# Patient Record
Sex: FEMALE | Race: WHITE | NOT HISPANIC OR LATINO | Employment: UNEMPLOYED | URBAN - METROPOLITAN AREA
[De-identification: names, ages, dates, MRNs, and addresses within clinical notes are randomized per-mention and may not be internally consistent; named-entity substitution may affect disease eponyms.]

---

## 2010-08-02 LAB — EXTERNAL HIV SCREEN: NORMAL

## 2017-02-28 ENCOUNTER — APPOINTMENT (OUTPATIENT)
Dept: LAB | Age: 47
End: 2017-02-28
Payer: COMMERCIAL

## 2017-02-28 ENCOUNTER — TRANSCRIBE ORDERS (OUTPATIENT)
Dept: LAB | Age: 47
End: 2017-02-28

## 2017-02-28 DIAGNOSIS — E78.1 PURE HYPERGLYCERIDEMIA: ICD-10-CM

## 2017-02-28 DIAGNOSIS — E03.9 HYPOTHYROIDISM: ICD-10-CM

## 2017-02-28 LAB
CHOLEST SERPL-MCNC: 182 MG/DL (ref 50–200)
HDLC SERPL-MCNC: 51 MG/DL (ref 40–60)
LDLC SERPL CALC-MCNC: 109 MG/DL (ref 0–100)
T4 FREE SERPL-MCNC: 1.15 NG/DL (ref 0.76–1.46)
TRIGL SERPL-MCNC: 110 MG/DL
TSH SERPL DL<=0.05 MIU/L-ACNC: 2.43 UIU/ML (ref 0.36–3.74)

## 2017-02-28 PROCEDURE — 80061 LIPID PANEL: CPT

## 2017-02-28 PROCEDURE — 84439 ASSAY OF FREE THYROXINE: CPT

## 2017-02-28 PROCEDURE — 36415 COLL VENOUS BLD VENIPUNCTURE: CPT

## 2017-02-28 PROCEDURE — 84443 ASSAY THYROID STIM HORMONE: CPT

## 2017-04-11 ENCOUNTER — ALLSCRIPTS OFFICE VISIT (OUTPATIENT)
Dept: OTHER | Facility: OTHER | Age: 47
End: 2017-04-11

## 2017-04-11 DIAGNOSIS — E55.9 VITAMIN D DEFICIENCY: ICD-10-CM

## 2017-04-11 DIAGNOSIS — I10 ESSENTIAL (PRIMARY) HYPERTENSION: ICD-10-CM

## 2017-04-11 DIAGNOSIS — E03.9 HYPOTHYROIDISM: ICD-10-CM

## 2017-04-11 DIAGNOSIS — E78.1 PURE HYPERGLYCERIDEMIA: ICD-10-CM

## 2017-04-12 ENCOUNTER — GENERIC CONVERSION - ENCOUNTER (OUTPATIENT)
Dept: OTHER | Facility: OTHER | Age: 47
End: 2017-04-12

## 2017-05-01 ENCOUNTER — ALLSCRIPTS OFFICE VISIT (OUTPATIENT)
Dept: OTHER | Facility: OTHER | Age: 47
End: 2017-05-01

## 2017-07-10 ENCOUNTER — GENERIC CONVERSION - ENCOUNTER (OUTPATIENT)
Dept: OTHER | Facility: OTHER | Age: 47
End: 2017-07-10

## 2017-10-12 ENCOUNTER — ALLSCRIPTS OFFICE VISIT (OUTPATIENT)
Dept: OTHER | Facility: OTHER | Age: 47
End: 2017-10-12

## 2017-10-12 DIAGNOSIS — E78.1 PURE HYPERGLYCERIDEMIA: ICD-10-CM

## 2017-10-12 DIAGNOSIS — L93.0 DISCOID LUPUS ERYTHEMATOSUS: ICD-10-CM

## 2017-10-12 DIAGNOSIS — E03.9 HYPOTHYROIDISM: ICD-10-CM

## 2017-10-13 NOTE — PROGRESS NOTES
Assessment  1  Hypertension, essential, benign (401 1) (I10)   2  Lupus (695 4) (L93 0)   3  Vitamin D deficiency (268 9) (E55 9)   4  Acquired hypothyroidism (244 9) (E03 9)   5  Essential hypertriglyceridemia (272 1) (E78 1)    Plan  Acquired hypothyroidism    · (1) T4, FREE; Status:Active; Requested for:12Oct2017;    · (1) TSH; Status:Active; Requested for:12Oct2017;   Depression screening    · PHQ-2 Adult Depression Screening ; every 1 year; Last 85SBU1925; Next 02XFT5166; Status:Active  Essential hypertriglyceridemia    · (1) COMPREHENSIVE METABOLIC PANEL; Status:Active; Requested for:12Oct2017;    · (1) LIPID PANEL FASTING W DIRECT LDL REFLEX; Status:Active; Requested for:12Oct2017;   Lupus    · (1) CBC/PLT/DIFF; Status:Active; Requested for:12Oct2017;    · Follow-up visit in 6 months Evaluation and Treatment  Follow-up  Status: Hold For - Scheduling   Requested for: 65CDH3467    Discussion/Summary  Discussion Summary:   Keep synthroid at 50 mcg daily, check labs in 6 months  no changes to medications  Counseling Documentation With Imm: The patient was counseled regarding diagnostic results  Chief Complaint  Chief Complaint Free Text Note Form: Patient is here for 6 months f/u for Hypertension, Lupus and Vitamin D deficiency  Review blood work  Pt does not have any new complains at the moment  was recommended to have a Pap test       History of Present Illness  SLE (Brief): The patient is being seen for a routine clinic follow-up of systemic lupus erythematosus  Symptoms:  joint pain, but-no fever,-no malaise,-no weight loss-and-no chest pain-   The patient presents with complaints of visual problems (follows with eye doctor every 6 months  )  (Follows with rheumatology  Plaquenil daily, Mobic daily, and Flexeril as needed  )   Vitamin D Deficiency: The patient is being seen for follow-up of vitamin D deficiency  Current treatment includes vitamin D3 (cholecalciferol) and 6000 IU per day   Symptoms: muscle twitching, but-no fatigue,-no brittle nails-and-no depression  Hypertension (Follow-Up): The patient presents for follow-up of essential hypertension  The patient states she has been doing well with her blood pressure control since the last visit  Symptoms: denies impaired vision,-denies dyspnea,-denies chest pain,-denies intermittent leg claudication-and-improved lower extremity edema  Associated symptoms include no headache  Home monitoring: The patient is not checking blood pressure at home  Lifestyle: Diet: She consumes a diverse and healthy diet  Exercise: She exercises regularly  (walk a lot )  Additional History: follows eye doctor every 6 months  Hyperlipidemia (Initial): The patient is being seen for a routine clinic follow-up of hyperlipidemia  Recent measurements for this patient were taken changed fish oil brand  Recent measurements: total cholesterol 182 mg/dL and  mg/dL  Her total cholesterol is decreasing and LDL is increasing  Associated symptoms:  no chest pain,-no exertional calf cramps-and-no nausea-   The patient presents with complaints of myalgias (attributes to lupus and connective tissue disease)  Hypothyroidism (Follow-Up): The patient is being seen for follow-up of hypothyroidism of undetermined etiology  The patient reports doing well  Interval Events: TSH in 12/16 was 8 64, now on levothyroxine is 2 4   Interval symptoms:  improved weight gain,-stable cold intolerance,-stable fatigue-and-denies constipation  Medications:  the patient is adherent to her medication regimen-(Levothyroxine 50mcg )  Review of Systems  Complete-Female:   Constitutional: no fever,-no recent weight gain-and-no recent weight loss  ENT: sore throat, but-no nosebleeds  Cardiovascular: no chest pain-and-no lower extremity edema  Respiratory: no shortness of breath,-no cough-and-no wheezing  Gastrointestinal: no nausea,-no vomiting-and-no diarrhea     Genitourinary: no dysuria  Musculoskeletal: Joint soreness back to normal Achiness per patient  , but-no joint swelling-and-no joint stiffness  Integumentary: itching  Neurological: no headache-and-no dizziness  Psychiatric: no sleep disturbances  Active Problems  1  Acquired hypothyroidism (244 9) (E03 9)   2  Essential hypertriglyceridemia (272 1) (E78 1)   3  Hypertension, essential, benign (401 1) (I10)   4  Lupus (695 4) (L93 0)   5  Vitamin D deficiency (268 9) (E55 9)    Past Medical History  1  History of Arthralgia (719 40) (M25 50)   2  History of Hand pain (729 5) (M79 643)   3  History of acute pharyngitis (V12 69) (Z87 09)   4  History of joint stiffness (V13 59) (Z87 39)   5  History of sinusitis (V12 69) (Z87 09)   6  History of Joint stiffness (719 50) (M25 60)   7  History of Mucositis (528 00) (K12 30)   8  History of Screening cholesterol level (V77 91) (Z13 220)   9  History of Sore throat (462) (J02 9)   10  History of Tooth pain (525 9) (K08 89)   11  History of Tooth pain (525 9) (K08 89)    Family History  Mother    1  Family history of Glaucoma   2  Family history of Type 2 diabetes mellitus  Father    3  Family history of Hypertension  Family History    4  Family history of Type 2 diabetes mellitus    Social History   · Does not exercise (V69 0) (Z72 3)   · Never a smoker   · No drug use   · No known STD risk factors   · Number of children   · Occupation   · Single   · Social alcohol use (Z78 9)  Social History Reviewed: The social history was reviewed and updated today  Current Meds   1  AmLODIPine Besylate 5 MG Oral Tablet; Take 1 tablet daily; Last Rx:11Apr2017 Ordered   2  Fish Oil CAPS; TAKE 1 CAPSULE Daily (1400 MG); Therapy: (Recorded:11Oct2017) to Recorded   3  Flexeril 5 MG TABS; take 1 tablet daily prn; Therapy: (Recorded:11Oct2017) to Recorded   4  Levothyroxine Sodium 50 MCG Oral Tablet; TAKE 1 TABLET DAILY; Therapy: 30Wld8987 to (Evaluate:08Oct2017);  Last Rx:11Apr2017 Ordered   5  Mobic 15 MG Oral Tablet; Take 1 tablet daily; Therapy: (Recorded:11Oct2017) to Recorded   6  Multiple Vitamin TABS; Take 1 tablet daily; Therapy: (Recorded:11Oct2017) to Recorded   7  Oxycodone-Acetaminophen 5-325 MG Oral Tablet; TAKE 1 TO 2 TABLETS EVERY 4 TO 6 HOURS AS   NEEDED FOR PAIN;   Therapy: 61IWR0118 to (Evaluate:44Ypx0118); Last JJ:16BMB5272 Ordered   8  Plaquenil 200 MG Oral Tablet; take 2 tablet daily; Therapy: (Recorded:11Oct2017) to Recorded   9  Vitamin D3 3000 UNIT Oral Tablet; take 2 tablet daily; Therapy: (Recorded:11Oct2017) to Recorded    Allergies  1  Penicillins  2  No Known Environmental Allergies   3  No Known Food Allergies    Vitals  Vital Signs    Recorded: 38KYV5616 08:49AM   Temperature 98 2 F, Oral   Heart Rate 93   Systolic 802, LUE, Sitting   Diastolic 80, LUE, Sitting   BP CUFF SIZE Large   Height 5 ft 4 in   Weight 234 lb 4 oz   BMI Calculated 40 21   BSA Calculated 2 09   O2 Saturation 99     Physical Exam    Constitutional   General appearance: No acute distress, well appearing and well nourished  -Alert, pleasant, cooperative, seated in room in NAD  Ears, Nose, Mouth, and Throat   External inspection of ears and nose: Normal     Oropharynx: Normal with no erythema, edema, exudate or lesions  Pulmonary   Respiratory effort: No increased work of breathing or signs of respiratory distress  -CTA throughout, no wheeze, no resp distress  Auscultation of lungs: Clear to auscultation  Cardiovascular   Auscultation of heart: Normal rate and rhythm, normal S1 and S2, without murmurs  -2/6, systolic murmur  Abdomen   Abdomen: Non-tender, no masses  Liver and spleen: No hepatomegaly or splenomegaly  Lymphatic   Palpation of lymph nodes in neck: No lymphadenopathy  -supple  Skin   Skin and subcutaneous tissue: Normal without rashes or lesions      Psychiatric   Mood and affect: Normal          Results/Data  PHQ-2 Adult Depression Screening 12Oct2017 08: 93IW User, Shaneka     Test Name Result Flag Reference   PHQ-2 Adult Depression Score 0     Over the last two weeks, how often have you been bothered by any of the following problems? Little interest or pleasure in doing things: Not at all - 0  Feeling down, depressed, or hopeless: Not at all - 0   PHQ-2 Adult Depression Screening Negative       (1) LIPID PANEL FASTING W DIRECT LDL REFLEX 03Oct2017 01:39PM James Dickens     Test Name Result Flag Reference   CHOLESTEROL 216     LDL CHOLESTEROL CALCULATED 135     TRIGLYCERIDES 119     HDL,DIRECT 57       Summary / No summary entered :      No summary entered  Documents attached :      189 Sirisha Hazel Work - [facility] pa, team; Enc: [id number] - Image Encounter - [facility] pa, team -      (Interventional Medicine) (Additional Information Document)  Health Management  Depression screening   PHevery-2 Adult Depression Screening; every 1 year; Last 01ULZ0021; Next Due: 52VVW3814; Active  History of Breast cancer screening   * MAMMO SCREENING BILATERAL W CAD; every 1 year; Last 85ADX3845; Next Due: 06Err3590; Active  History of Screening for malignant neoplasm of cervix   (1) THIN PREP PAP FOLLOW UP WITH IMAGING; every 3 years; Last 18MCS8819; Next Due:  76CLB9302;  Overdue    Signatures   Electronically signed by : Dani Santoyo DO; Oct 12 2017  9:22AM EST                       (Author)

## 2017-10-17 ENCOUNTER — GENERIC CONVERSION - ENCOUNTER (OUTPATIENT)
Dept: OTHER | Facility: OTHER | Age: 47
End: 2017-10-17

## 2018-01-13 VITALS
DIASTOLIC BLOOD PRESSURE: 80 MMHG | WEIGHT: 234.25 LBS | HEIGHT: 64 IN | BODY MASS INDEX: 39.99 KG/M2 | SYSTOLIC BLOOD PRESSURE: 120 MMHG | TEMPERATURE: 98.2 F | OXYGEN SATURATION: 99 % | HEART RATE: 93 BPM

## 2018-01-13 NOTE — RESULT NOTES
Verified Results  (1) TSH 25PGI6829 08:09AM Bob Bracket    Order Number: [id number]     Test Name Result Flag Reference   TSH 8 640 uIU/mL H 0 358-3 740   - Patient Instructions: This bloodwork is non-fasting  Please drink two glasses of water morning of bloodwork  - Patient Instructions: This bloodwork is non-fasting  Please drink two glasses of water morning of bloodwork  Patients undergoing fluorescein dye angiography may retain small amounts of fluorescein in the body for 48-72 hours post procedure  Samples containing fluorescein can produce falsely depressed TSH values  If the patient had this procedure,a specimen should be resubmitted post fluorescein clearance            The recommended reference ranges for TSH during pregnancy are as follows:  First trimester 0 1 to 2 5 uIU/mL  Second trimester  0 2 to 3 0 uIU/mL  Third trimester 0 3 to 3 0 uIU/m

## 2018-01-14 VITALS
OXYGEN SATURATION: 98 % | BODY MASS INDEX: 39.82 KG/M2 | DIASTOLIC BLOOD PRESSURE: 80 MMHG | HEIGHT: 64 IN | HEART RATE: 92 BPM | WEIGHT: 233.25 LBS | SYSTOLIC BLOOD PRESSURE: 128 MMHG | TEMPERATURE: 98.5 F

## 2018-01-15 VITALS
HEIGHT: 64 IN | TEMPERATURE: 99 F | WEIGHT: 234.6 LBS | OXYGEN SATURATION: 98 % | SYSTOLIC BLOOD PRESSURE: 118 MMHG | DIASTOLIC BLOOD PRESSURE: 68 MMHG | BODY MASS INDEX: 40.05 KG/M2 | HEART RATE: 89 BPM

## 2018-04-04 DIAGNOSIS — I10 HYPERTENSION, UNSPECIFIED TYPE: Primary | ICD-10-CM

## 2018-04-04 DIAGNOSIS — E03.9 HYPOTHYROIDISM, UNSPECIFIED TYPE: ICD-10-CM

## 2018-04-04 RX ORDER — LEVOTHYROXINE SODIUM 0.05 MG/1
50 TABLET ORAL DAILY
Qty: 90 TABLET | Refills: 1 | Status: SHIPPED | OUTPATIENT
Start: 2018-04-04 | End: 2018-11-05 | Stop reason: SDUPTHER

## 2018-04-04 RX ORDER — AMLODIPINE BESYLATE 5 MG/1
5 TABLET ORAL DAILY
Qty: 90 TABLET | Refills: 1 | Status: SHIPPED | OUTPATIENT
Start: 2018-04-04 | End: 2018-11-05 | Stop reason: SDUPTHER

## 2018-04-04 RX ORDER — LEVOTHYROXINE SODIUM 0.05 MG/1
1 TABLET ORAL DAILY
COMMUNITY
Start: 2016-12-22 | End: 2018-04-04 | Stop reason: SDUPTHER

## 2018-04-04 RX ORDER — AMLODIPINE BESYLATE 5 MG/1
1 TABLET ORAL DAILY
COMMUNITY
End: 2018-04-04 | Stop reason: SDUPTHER

## 2018-04-27 ENCOUNTER — OFFICE VISIT (OUTPATIENT)
Dept: INTERNAL MEDICINE CLINIC | Facility: CLINIC | Age: 48
End: 2018-04-27
Payer: COMMERCIAL

## 2018-04-27 VITALS
BODY MASS INDEX: 41.11 KG/M2 | OXYGEN SATURATION: 98 % | HEIGHT: 64 IN | DIASTOLIC BLOOD PRESSURE: 80 MMHG | TEMPERATURE: 98.3 F | WEIGHT: 240.8 LBS | SYSTOLIC BLOOD PRESSURE: 132 MMHG | HEART RATE: 88 BPM

## 2018-04-27 DIAGNOSIS — L93.0 LUPUS ERYTHEMATOSUS, UNSPECIFIED FORM: ICD-10-CM

## 2018-04-27 DIAGNOSIS — E55.9 VITAMIN D DEFICIENCY: ICD-10-CM

## 2018-04-27 DIAGNOSIS — I10 BENIGN ESSENTIAL HYPERTENSION: ICD-10-CM

## 2018-04-27 DIAGNOSIS — E78.1 ESSENTIAL HYPERTRIGLYCERIDEMIA: Primary | ICD-10-CM

## 2018-04-27 DIAGNOSIS — E03.9 ACQUIRED HYPOTHYROIDISM: ICD-10-CM

## 2018-04-27 DIAGNOSIS — R53.83 FATIGUE, UNSPECIFIED TYPE: ICD-10-CM

## 2018-04-27 PROCEDURE — 99214 OFFICE O/P EST MOD 30 MIN: CPT | Performed by: FAMILY MEDICINE

## 2018-04-27 PROCEDURE — 3079F DIAST BP 80-89 MM HG: CPT | Performed by: FAMILY MEDICINE

## 2018-04-27 PROCEDURE — 3075F SYST BP GE 130 - 139MM HG: CPT | Performed by: FAMILY MEDICINE

## 2018-04-27 RX ORDER — CYCLOBENZAPRINE HCL 10 MG
1 TABLET ORAL DAILY PRN
COMMUNITY
Start: 2017-10-24 | End: 2022-03-18 | Stop reason: SDUPTHER

## 2018-04-27 RX ORDER — ASCORBIC ACID 1000 MG
1000 TABLET, EXTENDED RELEASE ORAL DAILY
COMMUNITY
Start: 2013-10-22

## 2018-04-27 RX ORDER — HYDROXYCHLOROQUINE SULFATE 200 MG/1
400 TABLET, FILM COATED ORAL DAILY
COMMUNITY
Start: 2018-04-03 | End: 2019-04-25 | Stop reason: SDUPTHER

## 2018-04-27 RX ORDER — MELOXICAM 15 MG/1
1 TABLET ORAL DAILY
COMMUNITY
End: 2019-04-25 | Stop reason: SDUPTHER

## 2018-04-27 NOTE — PROGRESS NOTES
Assessment  1  Hypertension, essential, benign (401 1) (I10)   2  Lupus (695 4) (L93 0)   3  Vitamin D deficiency (268 9) (E55 9)   4  Acquired hypothyroidism (244 9) (E03 9)   5  Essential hypertriglyceridemia (272 1) (E78 1)     Plan  Acquired hypothyroidism    ·    ·  Essential hypertriglyceridemia    · Lupus   Discussion/Summary  Discussion Summary:   Keep synthroid at 50 mcg daily, no check labs in 6 months  no changes to medications  Counseling Documentation With Imm: The patient was counseled regarding diagnostic results  Chief Complaint  Chief Complaint Free Text Note Form: Patient is here for 6 months f/u for Hypertension, Lupus and Vitamin D deficiency  Review blood work  Pt does not have any new complains      History of Present Illness  SLE (Brief): The patient is being seen for a routine clinic follow-up of systemic lupus erythematosus  Symptoms:  joint pain, but-no fever,-no malaise,-no weight loss-and-no chest pain-   The patient presents with complaints of visual problems (follows with eye doctor every 6 months  )  (Follows with rheumatology  Plaquenil daily, Mobic daily, and Flexeril as needed  )     Vitamin D Deficiency: The patient is being seen for follow-up of vitamin D deficiency  Current treatment includes vitamin D3 (cholecalciferol) and 6000 IU per day  Symptoms:  myalgias but-no fatigue,-no brittle nails-and-no depression  Hypertension (Follow-Up): The patient presents for follow-up of essential hypertension  The patient states she has been doing well with her blood pressure control since the last visit  Symptoms: denies impaired vision,-denies dyspnea,-denies chest pain,-denies intermittent leg claudication-and-improved lower extremity edema  Associated symptoms include no headache  Home monitoring: The patient is not checking blood pressure at home  Lifestyle: Diet: She consumes a diverse and healthy diet  Exercise: She exercises regularly  (walk a lot )     Additional History: follows eye doctor every 6 months  Hyperlipidemia (Initial): The patient is being seen for a routine clinic follow-up of hyperlipidemia  Recent measurements for this patient were taken changed fish oil brand  Recent measurements: total cholesterol 182 mg/dL and  mg/dL  Her total cholesterol is decreasing and LDL is increasing  Associated symptoms:  no chest pain,-no exertional calf cramps-and-no nausea-   The patient presents with complaints of myalgias (attributes to lupus and connective tissue disease)  Hypothyroidism (Follow-Up): The patient is being seen for follow-up of hypothyroidism of undetermined etiology  The patient reports doing well  Interval Events: TSH in 12/16 was 8 64, now on levothyroxine is 2 4   Doing well  Interval symptoms:  improved weight gain,-stable cold intolerance,-stable fatigue-and-denies constipation  Medications:  the patient is adherent to her medication regimen-(Levothyroxine 50mcg )  Review of Systems  Complete-Female:   Constitutional: no fever,- recent weight gain-and-no recent weight loss  ENT: sore throat, but-no nosebleeds  Cardiovascular: no chest pain-and-no lower extremity edema  Respiratory: no shortness of breath,-no cough-and-no wheezing  Gastrointestinal: no nausea,-no vomiting-and-no diarrhea  Genitourinary: no dysuria  Musculoskeletal: Joint soreness back to normal Achiness per patient  , but-no joint swelling-and-no joint stiffness  Integumentary: no rashes or issues  Neurological: no headache-and-no dizziness  Psychiatric: no sleep disturbances  Vitals:    04/27/18 0827   BP: 132/80   Pulse: 88   Temp: 98 3 °F (36 8 °C)   SpO2: 98%     Physical Exam     Constitutional   General appearance: No acute distress, well appearing and well nourished  -Alert, pleasant, cooperative, seated in room in NAD     Ears, Nose, Mouth, and Throat   External inspection of ears and nose: Normal     Oropharynx: Normal with no erythema, edema, exudate or lesions  Pulmonary   Respiratory effort: No increased work of breathing or signs of respiratory distress  -CTA throughout, no wheeze, no resp distress  Auscultation of lungs: Clear to auscultation  Cardiovascular   Auscultation of heart: Normal rate and rhythm, normal S1 and S2, without murmurs  -2/6, systolic murmur  Abdomen   Abdomen: Non-tender, no masses  Liver and spleen: No hepatomegaly or splenomegaly  Lymphatic   Palpation of lymph nodes in neck: No lymphadenopathy  -supple  Skin   Skin and subcutaneous tissue: Normal without rashes or lesions      Psychiatric   Mood and affect: Normal

## 2018-10-26 ENCOUNTER — OFFICE VISIT (OUTPATIENT)
Dept: INTERNAL MEDICINE CLINIC | Facility: CLINIC | Age: 48
End: 2018-10-26
Payer: COMMERCIAL

## 2018-10-26 VITALS
HEIGHT: 63 IN | OXYGEN SATURATION: 98 % | TEMPERATURE: 98.5 F | DIASTOLIC BLOOD PRESSURE: 80 MMHG | HEART RATE: 82 BPM | SYSTOLIC BLOOD PRESSURE: 138 MMHG | BODY MASS INDEX: 41.71 KG/M2 | WEIGHT: 235.4 LBS

## 2018-10-26 DIAGNOSIS — E78.1 ESSENTIAL HYPERTRIGLYCERIDEMIA: ICD-10-CM

## 2018-10-26 DIAGNOSIS — Z23 NEEDS FLU SHOT: Primary | ICD-10-CM

## 2018-10-26 DIAGNOSIS — I10 BENIGN ESSENTIAL HYPERTENSION: ICD-10-CM

## 2018-10-26 DIAGNOSIS — E55.9 VITAMIN D DEFICIENCY: ICD-10-CM

## 2018-10-26 DIAGNOSIS — E03.9 ACQUIRED HYPOTHYROIDISM: ICD-10-CM

## 2018-10-26 PROCEDURE — 3079F DIAST BP 80-89 MM HG: CPT | Performed by: FAMILY MEDICINE

## 2018-10-26 PROCEDURE — 90686 IIV4 VACC NO PRSV 0.5 ML IM: CPT

## 2018-10-26 PROCEDURE — 90471 IMMUNIZATION ADMIN: CPT

## 2018-10-26 PROCEDURE — 99214 OFFICE O/P EST MOD 30 MIN: CPT | Performed by: FAMILY MEDICINE

## 2018-10-26 PROCEDURE — 3075F SYST BP GE 130 - 139MM HG: CPT | Performed by: FAMILY MEDICINE

## 2018-10-26 NOTE — PROGRESS NOTES
Assessment  1  Hypertension, essential, benign (401 1) (I10)   2  Lupus (695 4) (L93 0)   3  Vitamin D deficiency (268 9) (E55 9)   4  Acquired hypothyroidism (244 9) (E03 9)   5  Essential hypertriglyceridemia (272 1) (E78 1)     Plan  Acquired hypothyroidism    ·    ·  Essential hypertriglyceridemia    · Lupus   Discussion/Summary  Discussion Summary:   Keep synthroid at 50 mcg daily, no check labs in 6 months  no changes to medications  Congratulations on weight loss !! Keep appt with rheumatology  Counseling Documentation With Imm: The patient was counseled regarding diagnostic results  Chief Complaint  Chief Complaint Free Text Note Form: Patient is here for 6 months f/u for Hypertension, Lupus and Vitamin D deficiency  Review blood work  Pt does not have any new complains      History of Present Illness  SLE (Brief): The patient is being seen for a routine clinic follow-up of systemic lupus erythematosus  Symptoms:  joint pain, but-no fever,-no malaise,-no weight loss-and-no chest pain-   The patient presents with complaints of visual problems (follows with eye doctor every 6 months  )  (Follows with rheumatology  Plaquenil daily, Mobic daily, and Flexeril as needed  )     Vitamin D Deficiency: The patient is being seen for follow-up of vitamin D deficiency  Current treatment includes vitamin D3 (cholecalciferol) and 6000 IU per day  Symptoms:  myalgias but-no fatigue,-no brittle nails-and-no depression  Hypertension (Follow-Up): The patient presents for follow-up of essential hypertension  The patient states she has been doing well with her blood pressure control since the last visit  Symptoms: denies impaired vision,-denies dyspnea,-denies chest pain,-denies intermittent leg claudication-and-improved lower extremity edema  Associated symptoms include no headache  Home monitoring: The patient is not checking blood pressure at home     Lifestyle: Diet: She consumes a diverse and healthy diet Exercise: She exercises regularly  (walk a lot )  Additional History: follows eye doctor every 6 months  Hyperlipidemia (Initial): The patient is being seen for a routine clinic follow-up of hyperlipidemia  Recent measurements for this patient were taken changed fish oil brand  Recent measurements: total cholesterol 182 mg/dL and  mg/dL  Her total cholesterol is decreasing and LDL is increasing  Associated symptoms:  no chest pain,-no exertional calf cramps-and-no nausea-   The patient presents with complaints of myalgias (attributes to lupus and connective tissue disease)  Hypothyroidism (Follow-Up): The patient is being seen for follow-up of hypothyroidism of undetermined etiology  The patient reports doing well  Interval Events: TSH in 12/16 was 8 64, now on levothyroxine is 2 4   Doing well  Interval symptoms:  improved weight gain,-stable cold intolerance,-stable fatigue-and-denies constipation  Medications:  the patient is adherent to her medication regimen-(Levothyroxine 50mcg )  Review of Systems  Complete-Female:   Constitutional: no fever,- recent weight gain-and- 5 lb weight loss  Cardiovascular: no chest pain-and-no lower extremity edema  Respiratory: no shortness of breath,-no cough-and-no wheezing  Gastrointestinal: no nausea,-no vomiting-and-no diarrhea  Genitourinary: no dysuria  Musculoskeletal: Joint soreness back to normal Achiness per patient  , but-no joint swelling-and-no joint stiffness  Integumentary: no rashes or issues  Neurological: no headache-and-no dizziness  Psychiatric: no sleep disturbances  Vitals:    10/26/18 0927   BP: 138/80   Pulse: 82   Temp: 98 5 °F (36 9 °C)   SpO2: 98%     Physical Exam     Constitutional   General appearance: No acute distress, well appearing and well nourished  -Alert, pleasant, cooperative, seated in room in NAD     Ears, Nose, Mouth, and Throat   External inspection of ears and nose: Normal  Oropharynx: Normal with no erythema, edema, exudate or lesions  Pulmonary   Respiratory effort: No increased work of breathing or signs of respiratory distress  -CTA throughout, no wheeze, no resp distress  Auscultation of lungs: Clear to auscultation  Cardiovascular   Auscultation of heart: Normal rate and rhythm, normal S1 and S2, without murmurs  -2/6, systolic murmur  Abdomen   Abdomen: Non-tender, no masses  Liver and spleen: No hepatomegaly or splenomegaly  Lymphatic   Palpation of lymph nodes in neck: No lymphadenopathy  -supple  Skin   Skin and subcutaneous tissue: Normal without rashes or lesions      Psychiatric   Mood and affect: Normal

## 2018-11-05 DIAGNOSIS — I10 HYPERTENSION, UNSPECIFIED TYPE: ICD-10-CM

## 2018-11-05 DIAGNOSIS — E03.9 HYPOTHYROIDISM, UNSPECIFIED TYPE: ICD-10-CM

## 2018-11-05 RX ORDER — AMLODIPINE BESYLATE 5 MG/1
5 TABLET ORAL DAILY
Qty: 90 TABLET | Refills: 1 | Status: CANCELLED | OUTPATIENT
Start: 2018-11-05

## 2018-11-05 RX ORDER — LEVOTHYROXINE SODIUM 0.05 MG/1
50 TABLET ORAL DAILY
Qty: 90 TABLET | Refills: 1 | Status: SHIPPED | OUTPATIENT
Start: 2018-11-05 | End: 2019-04-25 | Stop reason: SDUPTHER

## 2018-11-05 RX ORDER — LEVOTHYROXINE SODIUM 0.05 MG/1
50 TABLET ORAL DAILY
Qty: 90 TABLET | Refills: 1 | Status: CANCELLED | OUTPATIENT
Start: 2018-11-05

## 2018-11-05 RX ORDER — AMLODIPINE BESYLATE 5 MG/1
5 TABLET ORAL DAILY
Qty: 90 TABLET | Refills: 1 | Status: SHIPPED | OUTPATIENT
Start: 2018-11-05 | End: 2019-04-25 | Stop reason: SDUPTHER

## 2018-11-20 ENCOUNTER — OFFICE VISIT (OUTPATIENT)
Dept: INTERNAL MEDICINE CLINIC | Age: 48
End: 2018-11-20
Payer: COMMERCIAL

## 2018-11-20 VITALS
OXYGEN SATURATION: 96 % | HEIGHT: 63 IN | HEART RATE: 93 BPM | DIASTOLIC BLOOD PRESSURE: 76 MMHG | WEIGHT: 234.8 LBS | SYSTOLIC BLOOD PRESSURE: 126 MMHG | TEMPERATURE: 98.1 F | BODY MASS INDEX: 41.6 KG/M2

## 2018-11-20 DIAGNOSIS — J06.0 SORE THROAT AND LARYNGITIS: Primary | ICD-10-CM

## 2018-11-20 PROCEDURE — 3008F BODY MASS INDEX DOCD: CPT | Performed by: FAMILY MEDICINE

## 2018-11-20 PROCEDURE — 1036F TOBACCO NON-USER: CPT | Performed by: FAMILY MEDICINE

## 2018-11-20 PROCEDURE — 99213 OFFICE O/P EST LOW 20 MIN: CPT | Performed by: FAMILY MEDICINE

## 2018-11-20 RX ORDER — CEPHALEXIN 500 MG/1
500 CAPSULE ORAL
Qty: 21 CAPSULE | Refills: 0 | Status: SHIPPED | OUTPATIENT
Start: 2018-11-20 | End: 2018-11-27

## 2018-11-20 NOTE — PROGRESS NOTES
Assessment/Plan:    No problem-specific Assessment & Plan notes found for this encounter  Problem List Items Addressed This Visit        Respiratory    Sore throat and laryngitis - Primary    Relevant Medications    cephalexin (KEFLEX) 500 mg capsule            Subjective:      Patient ID: Regina Woods is a 57 y o  female  HPI       sore throat x 6 days, using OTC mucinex, fevers at  Home > 100  Feels very achy, no sick contact         The following portions of the patient's history were reviewed and updated as appropriate: allergies, current medications, past family history, past medical history, past social history, past surgical history and problem list     Review of Systems    Constitutional:  +fever- > 100 0 or chills   Eyes:  Denies change in visual acuity   HENT:   See HPI   Respiratory:  Denies  shortness of breath or wheezing, + cough  GI:  Denies abdominal pain, nausea, or vomiting    Neurologic:  Denies headache or focal weakness        Objective:      /76 (BP Location: Left arm, Patient Position: Sitting, Cuff Size: Adult)   Pulse 93   Temp 98 1 °F (36 7 °C) (Tympanic)   Ht 5' 2 99" (1 6 m)   Wt 107 kg (234 lb 12 8 oz)   SpO2 96%   BMI 41 60 kg/m²          Physical Exam    Constitutional:  Well developed, well nourished, no acute distress, non-toxic appearance , very hoarse voice  Eyes:  PERRL, conjunctiva normal , non icteric sclera  HENT:  Atraumatic, oropharynx moist  Neck-  supple , + PND, = sinus TTP  Respiratory:  CTA b/l, normal breath sounds, no rales, no wheezing   Cardiovascular:  RRR, no murmurs, no LE edema b/l  GI:  Soft, nondistended, normal bowel sounds x 4, nontender, no organomegaly, no mass, no rebound, no guarding   Neurologic:  no focal deficits noted   Psychiatric:  Speech and behavior appropriate , AAO x 3

## 2018-11-21 ENCOUNTER — DOCUMENTATION (OUTPATIENT)
Dept: INTERNAL MEDICINE CLINIC | Age: 48
End: 2018-11-21

## 2019-04-25 ENCOUNTER — OFFICE VISIT (OUTPATIENT)
Dept: INTERNAL MEDICINE CLINIC | Age: 49
End: 2019-04-25
Payer: COMMERCIAL

## 2019-04-25 VITALS
WEIGHT: 233.4 LBS | TEMPERATURE: 98.4 F | HEART RATE: 89 BPM | OXYGEN SATURATION: 98 % | BODY MASS INDEX: 41.36 KG/M2 | SYSTOLIC BLOOD PRESSURE: 124 MMHG | DIASTOLIC BLOOD PRESSURE: 82 MMHG

## 2019-04-25 DIAGNOSIS — I10 HYPERTENSION, UNSPECIFIED TYPE: ICD-10-CM

## 2019-04-25 DIAGNOSIS — E78.1 ESSENTIAL HYPERTRIGLYCERIDEMIA: ICD-10-CM

## 2019-04-25 DIAGNOSIS — I10 BENIGN ESSENTIAL HYPERTENSION: Primary | ICD-10-CM

## 2019-04-25 DIAGNOSIS — E03.9 HYPOTHYROIDISM, UNSPECIFIED TYPE: ICD-10-CM

## 2019-04-25 DIAGNOSIS — E03.9 ACQUIRED HYPOTHYROIDISM: ICD-10-CM

## 2019-04-25 DIAGNOSIS — M35.1 MIXED CONNECTIVE TISSUE DISEASE (HCC): ICD-10-CM

## 2019-04-25 DIAGNOSIS — E55.9 VITAMIN D DEFICIENCY: ICD-10-CM

## 2019-04-25 PROBLEM — J06.0 SORE THROAT AND LARYNGITIS: Status: RESOLVED | Noted: 2018-11-20 | Resolved: 2019-04-25

## 2019-04-25 PROCEDURE — 1036F TOBACCO NON-USER: CPT | Performed by: FAMILY MEDICINE

## 2019-04-25 PROCEDURE — 99214 OFFICE O/P EST MOD 30 MIN: CPT | Performed by: FAMILY MEDICINE

## 2019-04-25 PROCEDURE — 3074F SYST BP LT 130 MM HG: CPT | Performed by: FAMILY MEDICINE

## 2019-04-25 PROCEDURE — 3079F DIAST BP 80-89 MM HG: CPT | Performed by: FAMILY MEDICINE

## 2019-04-25 RX ORDER — MELOXICAM 15 MG/1
15 TABLET ORAL DAILY
Qty: 90 TABLET | Refills: 1 | Status: SHIPPED | OUTPATIENT
Start: 2019-04-25 | End: 2021-09-24 | Stop reason: SDUPTHER

## 2019-04-25 RX ORDER — LEVOTHYROXINE SODIUM 0.05 MG/1
50 TABLET ORAL DAILY
Qty: 90 TABLET | Refills: 1 | Status: SHIPPED | OUTPATIENT
Start: 2019-04-25 | End: 2019-10-29 | Stop reason: SDUPTHER

## 2019-04-25 RX ORDER — HYDROXYCHLOROQUINE SULFATE 200 MG/1
400 TABLET, FILM COATED ORAL DAILY
Qty: 180 TABLET | Refills: 1 | Status: SHIPPED | OUTPATIENT
Start: 2019-04-25 | End: 2021-09-24 | Stop reason: SDUPTHER

## 2019-04-25 RX ORDER — AMLODIPINE BESYLATE 5 MG/1
5 TABLET ORAL DAILY
Qty: 90 TABLET | Refills: 1 | Status: SHIPPED | OUTPATIENT
Start: 2019-04-25 | End: 2021-09-24 | Stop reason: SDUPTHER

## 2019-10-29 ENCOUNTER — OFFICE VISIT (OUTPATIENT)
Dept: INTERNAL MEDICINE CLINIC | Facility: CLINIC | Age: 49
End: 2019-10-29
Payer: COMMERCIAL

## 2019-10-29 VITALS
HEIGHT: 63 IN | TEMPERATURE: 98.3 F | HEART RATE: 86 BPM | BODY MASS INDEX: 41.36 KG/M2 | WEIGHT: 233.4 LBS | SYSTOLIC BLOOD PRESSURE: 120 MMHG | DIASTOLIC BLOOD PRESSURE: 82 MMHG | OXYGEN SATURATION: 97 %

## 2019-10-29 DIAGNOSIS — E03.9 HYPOTHYROIDISM, UNSPECIFIED TYPE: ICD-10-CM

## 2019-10-29 DIAGNOSIS — E03.9 ACQUIRED HYPOTHYROIDISM: Primary | ICD-10-CM

## 2019-10-29 DIAGNOSIS — E55.9 VITAMIN D DEFICIENCY: ICD-10-CM

## 2019-10-29 DIAGNOSIS — I10 BENIGN ESSENTIAL HYPERTENSION: ICD-10-CM

## 2019-10-29 DIAGNOSIS — E78.1 ESSENTIAL HYPERTRIGLYCERIDEMIA: ICD-10-CM

## 2019-10-29 DIAGNOSIS — M35.1 MIXED CONNECTIVE TISSUE DISEASE (HCC): ICD-10-CM

## 2019-10-29 PROCEDURE — 3079F DIAST BP 80-89 MM HG: CPT | Performed by: FAMILY MEDICINE

## 2019-10-29 PROCEDURE — 3074F SYST BP LT 130 MM HG: CPT | Performed by: FAMILY MEDICINE

## 2019-10-29 PROCEDURE — 99214 OFFICE O/P EST MOD 30 MIN: CPT | Performed by: FAMILY MEDICINE

## 2019-10-29 PROCEDURE — 3008F BODY MASS INDEX DOCD: CPT | Performed by: FAMILY MEDICINE

## 2019-10-29 RX ORDER — LEVOTHYROXINE SODIUM 0.05 MG/1
50 TABLET ORAL DAILY
Qty: 90 TABLET | Refills: 3 | Status: SHIPPED | OUTPATIENT
Start: 2019-10-29 | End: 2020-10-08 | Stop reason: SDUPTHER

## 2019-10-29 NOTE — PROGRESS NOTES
Assessment  1  Hypertension, essential, benign (401 1) (I10)   2  Mixed connective tissue disorder   3  Vitamin D deficiency (268 9) (E55 9)   4  Acquired hypothyroidism (244 9) (E03 9)   5  Essential hypertriglyceridemia (272 1) (E78 1)     Plan  Acquired hypothyroidism    ·    ·  Essential hypertriglyceridemia   Mixed connective tissue disorder    BMI Counseling: Body mass index is 41 34 kg/m²  Discussed the patient's BMI with her  The BMI is above average  BMI counseling and education was provided to the patient  Nutrition recommendations include reducing portion sizes, decreasing overall calorie intake and 3-5 servings of fruits/vegetables daily  Exercise recommendations include moderate aerobic physical activity for 150 minutes/week and vigorous aerobic physical activity for 75 minutes/week  Discussion/Summary  Discussion Summary:   Keep synthroid at 50 mcg daily,  no changes to medications  Congratulations on weight loss !! Keep appt with rheumatology  Medications refilled today  Return in 6 months-no labs next discussed KETO diet  Counseling Documentation With Imm: The patient was counseled regarding diagnostic results  Chief Complaint  Chief Complaint Free Text Note Form: Patient is here for 6 months f/u for Hypertension, Lupus and Vitamin D deficiency  Review blood work  Pt does not have any new complains      History of Present Illness  SLE (Brief): The patient is being seen for a routine clinic follow-up of systemic lupus erythematosus  Symptoms:  joint pain, but-no fever,-no malaise,-no weight loss-and-no chest pain-   The patient presents with complaints of visual problems (follows with eye doctor every 6 months  )  (Follows with rheumatology   Plaquenil daily, Mobic daily, and Flexeril as needed  )  April 2019, now has been diagnosed not with lupus with mixed connective tissue disease feels worse with barometric pressure elevated but is working fine and not missing work oct 2019 symptoms are better since her busy work season is betetr ow    Vitamin D Deficiency: The patient is being seen for follow-up of vitamin D deficiency  Current treatment includes vitamin D3 (cholecalciferol) and 6000 IU per day  Symptoms:  myalgias but-no fatigue,-no brittle nails-and-no depression  April 2019, taking increase higher doses over-the-counter of 8000 international units  Does not want weekly dose at this time  Level is 32  Followed by Rheumatology oct 2019 now sees them yearly, due for labs    Hypertension (Follow-Up): The patient presents for follow-up of essential hypertension  The patient states she has been doing well with her blood pressure control since the last visit  Symptoms: denies impaired vision,-denies dyspnea,-denies chest pain,-denies intermittent leg claudication-and-improved lower extremity edema  Associated symptoms include no headache  Home monitoring: The patient is not checking blood pressure at home  Lifestyle: Diet: She consumes a diverse and healthy diet  Exercise: She exercises regularly  (walk a lot ) 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  Additional History: follows eye doctor every 6 months  Hyperlipidemia (Initial): The patient is being seen for a routine clinic follow-up of hyperlipidemia  Recent measurements for this patient were taken changed fish oil brand  Recent measurements: total cholesterol 182 mg/dL and  mg/dL  Her total cholesterol is decreasing and LDL is increasing  Associated symptoms:  no chest pain,-no exertional calf cramps-and-no nausea-  Oct 2019 due for labs now, last labs are controlled  The patient presents with complaints of myalgias (attributes to lupus and connective tissue disease)  Hypothyroidism (Follow-Up): The patient is being seen for follow-up of hypothyroidism of undetermined etiology  The patient reports doing well  Interval Events: TSH in 12/16 was 8 64, now on levothyroxine is 2 4     Doing well April 2019, in range oct 2019 labs are yearly now  Interval symptoms:  improved weight gain,-stable cold intolerance,-stable fatigue-and-denies constipation  Medications:  the patient is adherent to her medication regimen-(Levothyroxine 50mcg )  Review of Systems  Complete-Female:   Constitutional: no fever,- recent weight gain-and- no changes in weight since last visit  Cardiovascular: no chest pain-and-no lower extremity edema  Respiratory: no shortness of breath,-no cough-and-no wheezing  Gastrointestinal: no nausea,-no vomiting-and-no diarrhea  No gerd  Genitourinary: no dysuria no frequency no hematuria  Musculoskeletal: Joint soreness back to normal, but-no joint swelling-and-no joint stiffness  Integumentary: no rashes or issues  Neurological: no headache-and-no dizziness  Psychiatric: no sleep disturbances  Vitals:    10/29/19 0905   BP: 120/82   Pulse: 86   Temp: 98 3 °F (36 8 °C)   SpO2: 97%     Physical Exam     Constitutional   General appearance: No acute distress, well appearing and well nourished  -Alert, pleasant, cooperative, seated in room in NAD  Ears, Nose, Mouth, and Throat   External inspection of ears and nose: Normal     Oropharynx: Normal with no erythema, edema, exudate or lesions  Pulmonary   Respiratory effort: No increased work of breathing or signs of respiratory distress  -CTA throughout, no wheeze, no resp distress  Auscultation of lungs: Clear to auscultation  Cardiovascular   Auscultation of heart: Normal rate and rhythm, normal S1 and S2, without murmurs  -2/6, systolic murmur  Abdomen   Abdomen: Non-tender, no masses  Liver and spleen: No hepatomegaly or splenomegaly  Skin   Skin and subcutaneous tissue: Normal without rashes or lesions      Psychiatric   Mood and affect: Normal

## 2020-02-10 ENCOUNTER — OFFICE VISIT (OUTPATIENT)
Dept: INTERNAL MEDICINE CLINIC | Age: 50
End: 2020-02-10
Payer: COMMERCIAL

## 2020-02-10 VITALS
HEART RATE: 93 BPM | TEMPERATURE: 100 F | HEIGHT: 63 IN | OXYGEN SATURATION: 96 % | WEIGHT: 233 LBS | SYSTOLIC BLOOD PRESSURE: 150 MMHG | BODY MASS INDEX: 41.29 KG/M2 | DIASTOLIC BLOOD PRESSURE: 82 MMHG

## 2020-02-10 DIAGNOSIS — R68.89 FLU-LIKE SYMPTOMS: Primary | ICD-10-CM

## 2020-02-10 DIAGNOSIS — I10 BENIGN ESSENTIAL HYPERTENSION: ICD-10-CM

## 2020-02-10 PROCEDURE — 3077F SYST BP >= 140 MM HG: CPT | Performed by: NURSE PRACTITIONER

## 2020-02-10 PROCEDURE — 3079F DIAST BP 80-89 MM HG: CPT | Performed by: NURSE PRACTITIONER

## 2020-02-10 PROCEDURE — 99213 OFFICE O/P EST LOW 20 MIN: CPT | Performed by: NURSE PRACTITIONER

## 2020-02-10 PROCEDURE — 1036F TOBACCO NON-USER: CPT | Performed by: NURSE PRACTITIONER

## 2020-02-10 PROCEDURE — 3008F BODY MASS INDEX DOCD: CPT | Performed by: NURSE PRACTITIONER

## 2020-02-10 RX ORDER — PROMETHAZINE HYDROCHLORIDE AND CODEINE PHOSPHATE 6.25; 1 MG/5ML; MG/5ML
5 SYRUP ORAL EVERY 4 HOURS PRN
Qty: 120 ML | Refills: 0 | Status: SHIPPED | OUTPATIENT
Start: 2020-02-10 | End: 2020-11-02 | Stop reason: ALTCHOICE

## 2020-02-10 RX ORDER — BENZONATATE 200 MG/1
200 CAPSULE ORAL 3 TIMES DAILY PRN
Qty: 30 CAPSULE | Refills: 0 | Status: SHIPPED | OUTPATIENT
Start: 2020-02-10 | End: 2020-11-02 | Stop reason: ALTCHOICE

## 2020-02-10 NOTE — PROGRESS NOTES
Assessment/Plan:    Flu-like symptoms  Illness appears to be viral in nature  Rest and fluids advised  Educated that the course of this illness could be 2-4 weeks  Discussed symptomatic relief, such as warm steam inhalations, tylenol/ibuprofen for fevers and body aches, rest, and drink plenty of fluids  Warm salt gargles for sore throat  Discussed red flag signs to go to the ER, such as chest pain or shortness of breath  Return to the office for reevaluation if symptoms worsen or do not improve in 1-2 weeks      Benign essential hypertension   Patient's blood pressure elevated on exam today, patient states she did not take her Norvasc this morning  Encourage patient to take when she gets home  Continue current regimen -   Continue to monitor blood pressure at home  Goal BP is < 130/80  Contact our office for consistent elevations  Recommend low sodium diet  Exercise 30 minutes 5 times a week as tolerated  Recommend yearly eye exam         BMI Counseling: Body mass index is 41 27 kg/m²  The BMI is above normal  Nutrition recommendations include decreasing portion sizes, encouraging healthy choices of fruits and vegetables, decreasing fast food intake, consuming healthier snacks, limiting drinks that contain sugar, moderation in carbohydrate intake, increasing intake of lean protein, reducing intake of saturated and trans fat and reducing intake of cholesterol  Exercise recommendations include moderate physical activity 150 minutes/week and exercising 3-5 times per week  Diagnoses and all orders for this visit:    Flu-like symptoms  -     promethazine-codeine (PHENERGAN WITH CODEINE) 6 25-10 mg/5 mL syrup; Take 5 mL by mouth every 4 (four) hours as needed for cough  -     benzonatate (TESSALON) 200 MG capsule; Take 1 capsule (200 mg total) by mouth 3 (three) times a day as needed for cough    Benign essential hypertension          Subjective:      Patient ID: Nikolay Robbins is a 54 y o  female  Patient presents today with flu-like symptoms, she has complaints of chest congestion, ear pressure, headaches and sinus pressure  Patient reports that her symptoms came on suddenly approximately 1 week ago  Patient does it states she was exposed to the flu  Patient did have flu vaccination this year  Patient's blood pressure noted to be elevated on exam today, patient states she did not take her blood pressure medication this morning  URI    This is a new problem  The current episode started 1 to 4 weeks ago  The problem has been unchanged  Maximum temperature: feels feverish  Associated symptoms include congestion, coughing (dry cough), ear pain, headaches, a plugged ear sensation, sinus pain, a sore throat and wheezing  Pertinent negatives include no abdominal pain, chest pain, diarrhea, dysuria, nausea, neck pain, rash, rhinorrhea or vomiting  Treatments tried: mucinex, excedrine  The treatment provided no relief  The following portions of the patient's history were reviewed and updated as appropriate: allergies, current medications, past family history, past medical history, past social history, past surgical history and problem list     Review of Systems   Constitutional: Positive for chills, fatigue and fever  Negative for activity change, appetite change and diaphoresis  HENT: Positive for congestion, ear pain, sinus pain and sore throat  Negative for ear discharge, postnasal drip, rhinorrhea and sinus pressure  Eyes: Negative for pain, discharge, itching and visual disturbance  Respiratory: Positive for cough (dry cough) and wheezing  Negative for chest tightness and shortness of breath  Cardiovascular: Negative for chest pain, palpitations and leg swelling  Gastrointestinal: Negative for abdominal pain, constipation, diarrhea, nausea and vomiting  Endocrine: Negative for polydipsia, polyphagia and polyuria     Genitourinary: Negative for difficulty urinating, dysuria and urgency  Musculoskeletal: Negative for arthralgias, back pain and neck pain  Skin: Negative for rash and wound  Neurological: Positive for headaches  Negative for dizziness, weakness and numbness           Past Medical History:   Diagnosis Date    Arthralgia     diffuse    BMI 40 0-44 9, adult ([facility] ) 10/29/2019    Disease of thyroid gland     Hypertension     Lupus ([place] [id number] ) 10/19/2015    Mucositis          Current Outpatient Medications:     amLODIPine (NORVASC) 5 mg tablet, Take 1 tablet (5 mg total) by mouth daily, Disp: 90 tablet, Rfl: 1    Ascorbic Acid (VITAMIN C ER) 1000 MG TBCR, Take 1,000 mg by mouth daily, Disp: , Rfl:     Cholecalciferol 2000 units CAPS, Take 2,000 Units by mouth 4 (four) times a day , Disp: , Rfl:     cyclobenzaprine (FLEXERIL) 10 mg tablet, Take 1 tablet by mouth daily as needed, Disp: , Rfl:     hydroxychloroquine (PLAQUENIL) 200 mg tablet, Take 2 tablets (400 mg total) by mouth daily for 90 days, Disp: 180 tablet, Rfl: 1    levothyroxine 50 mcg tablet, Take 1 tablet (50 mcg total) by mouth daily, Disp: 90 tablet, Rfl: 3    meloxicam (MOBIC) 15 mg tablet, Take 1 tablet (15 mg total) by mouth daily, Disp: 90 tablet, Rfl: 1    Multiple Vitamins-Minerals (MULTIVITAMIN ADULT PO), Take 1 capsule by mouth, Disp: , Rfl:     Omega-3 Fatty Acids (EQL OMEGA 3 FISH OIL) 1400 MG CAPS, Take 1 capsule by mouth daily, Disp: , Rfl:     benzonatate (TESSALON) 200 MG capsule, Take 1 capsule (200 mg total) by mouth 3 (three) times a day as needed for cough, Disp: 30 capsule, Rfl: 0    promethazine-codeine (PHENERGAN WITH CODEINE) 6 25-10 mg/5 mL syrup, Take 5 mL by mouth every 4 (four) hours as needed for cough, Disp: 120 mL, Rfl: 0    No Known Allergies    Social History   Past Surgical History:   Procedure Laterality Date    WISDOM TOOTH EXTRACTION       Family History   Problem Relation Age of Onset    Glaucoma Mother     Diabetes Mother     Hypertension Father Objective:  /82 (BP Location: Left arm, Patient Position: Sitting, Cuff Size: Large)   Pulse 93   Temp 100 °F (37 8 °C) (Tympanic)   Ht 5' 3" (1 6 m)   Wt 106 kg (233 lb) Comment: shoes on  SpO2 96%   BMI 41 27 kg/m²     No results found for this or any previous visit (from the past 1344 hour(s))  Physical Exam   Constitutional: She is oriented to person, place, and time  She appears well-developed and well-nourished  No distress  Appears acutely ill, skin is clammy   HENT:   Head: Normocephalic and atraumatic  Right Ear: External ear normal  Tympanic membrane is bulging  Tympanic membrane is not erythematous  A middle ear effusion is present  Left Ear: External ear normal  Tympanic membrane is bulging  Tympanic membrane is not erythematous  A middle ear effusion is present  Nose: Mucosal edema and rhinorrhea present  Mouth/Throat: Mucous membranes are pale and dry  Posterior oropharyngeal erythema present  No oropharyngeal exudate or posterior oropharyngeal edema  Eyes: Pupils are equal, round, and reactive to light  Conjunctivae and EOM are normal  Right eye exhibits no discharge  Left eye exhibits no discharge  Neck: Normal range of motion  Neck supple  No thyromegaly present  Cardiovascular: Normal rate, regular rhythm, normal heart sounds and intact distal pulses  Exam reveals no gallop and no friction rub  No murmur heard  Pulmonary/Chest: Effort normal and breath sounds normal  No stridor  No respiratory distress  She has no wheezes  She has no rales  Abdominal: Soft  Bowel sounds are normal  She exhibits no distension  There is no tenderness  Lymphadenopathy:        Head (right side): Submandibular adenopathy present  Head (left side): Submandibular adenopathy present  She has no cervical adenopathy  Neurological: She is alert and oriented to person, place, and time  Skin: Skin is warm and dry  No rash noted  She is not diaphoretic   No erythema  Psychiatric: She has a normal mood and affect   Her behavior is normal  Judgment and thought content normal

## 2020-02-10 NOTE — ASSESSMENT & PLAN NOTE
Patient's blood pressure elevated on exam today, patient states she did not take her Norvasc this morning  Encourage patient to take when she gets home  Continue current regimen -   Continue to monitor blood pressure at home  Goal BP is < 130/80  Contact our office for consistent elevations  Recommend low sodium diet  Exercise 30 minutes 5 times a week as tolerated    Recommend yearly eye exam

## 2020-06-12 ENCOUNTER — TELEPHONE (OUTPATIENT)
Dept: OTHER | Facility: HOSPITAL | Age: 50
End: 2020-06-12

## 2020-10-08 DIAGNOSIS — E03.9 HYPOTHYROIDISM, UNSPECIFIED TYPE: ICD-10-CM

## 2020-10-08 RX ORDER — LEVOTHYROXINE SODIUM 0.05 MG/1
50 TABLET ORAL DAILY
Qty: 90 TABLET | Refills: 0 | Status: SHIPPED | OUTPATIENT
Start: 2020-10-08 | End: 2021-01-05 | Stop reason: SDUPTHER

## 2020-11-02 ENCOUNTER — OFFICE VISIT (OUTPATIENT)
Dept: INTERNAL MEDICINE CLINIC | Age: 50
End: 2020-11-02
Payer: COMMERCIAL

## 2020-11-02 VITALS
TEMPERATURE: 99.3 F | WEIGHT: 231.2 LBS | OXYGEN SATURATION: 97 % | BODY MASS INDEX: 40.96 KG/M2 | HEART RATE: 98 BPM | SYSTOLIC BLOOD PRESSURE: 140 MMHG | DIASTOLIC BLOOD PRESSURE: 88 MMHG | HEIGHT: 63 IN

## 2020-11-02 DIAGNOSIS — I10 BENIGN ESSENTIAL HYPERTENSION: ICD-10-CM

## 2020-11-02 DIAGNOSIS — E03.9 ACQUIRED HYPOTHYROIDISM: ICD-10-CM

## 2020-11-02 DIAGNOSIS — E78.1 ESSENTIAL HYPERTRIGLYCERIDEMIA: Primary | ICD-10-CM

## 2020-11-02 DIAGNOSIS — E55.9 VITAMIN D DEFICIENCY: ICD-10-CM

## 2020-11-02 PROBLEM — R68.89 FLU-LIKE SYMPTOMS: Status: RESOLVED | Noted: 2020-02-10 | Resolved: 2020-11-02

## 2020-11-02 PROCEDURE — 3008F BODY MASS INDEX DOCD: CPT | Performed by: FAMILY MEDICINE

## 2020-11-02 PROCEDURE — 99214 OFFICE O/P EST MOD 30 MIN: CPT | Performed by: FAMILY MEDICINE

## 2020-11-02 PROCEDURE — 3077F SYST BP >= 140 MM HG: CPT | Performed by: FAMILY MEDICINE

## 2020-11-02 PROCEDURE — 3079F DIAST BP 80-89 MM HG: CPT | Performed by: FAMILY MEDICINE

## 2020-11-02 PROCEDURE — 1036F TOBACCO NON-USER: CPT | Performed by: FAMILY MEDICINE

## 2021-01-05 DIAGNOSIS — E03.9 HYPOTHYROIDISM, UNSPECIFIED TYPE: ICD-10-CM

## 2021-01-05 RX ORDER — LEVOTHYROXINE SODIUM 0.05 MG/1
50 TABLET ORAL DAILY
Qty: 90 TABLET | Refills: 1 | Status: SHIPPED | OUTPATIENT
Start: 2021-01-05 | End: 2021-08-02 | Stop reason: SDUPTHER

## 2021-05-14 ENCOUNTER — OFFICE VISIT (OUTPATIENT)
Dept: INTERNAL MEDICINE CLINIC | Age: 51
End: 2021-05-14
Payer: COMMERCIAL

## 2021-05-14 VITALS
OXYGEN SATURATION: 98 % | WEIGHT: 229.1 LBS | TEMPERATURE: 98.3 F | DIASTOLIC BLOOD PRESSURE: 86 MMHG | SYSTOLIC BLOOD PRESSURE: 132 MMHG | BODY MASS INDEX: 40.59 KG/M2 | HEART RATE: 96 BPM | HEIGHT: 63 IN

## 2021-05-14 DIAGNOSIS — Z82.49 FAMILY HISTORY OF EARLY CAD: ICD-10-CM

## 2021-05-14 DIAGNOSIS — E55.9 VITAMIN D DEFICIENCY: ICD-10-CM

## 2021-05-14 DIAGNOSIS — E78.1 ESSENTIAL HYPERTRIGLYCERIDEMIA: ICD-10-CM

## 2021-05-14 DIAGNOSIS — E03.9 ACQUIRED HYPOTHYROIDISM: Primary | ICD-10-CM

## 2021-05-14 DIAGNOSIS — M35.1 MIXED CONNECTIVE TISSUE DISEASE (HCC): ICD-10-CM

## 2021-05-14 DIAGNOSIS — I10 BENIGN ESSENTIAL HYPERTENSION: ICD-10-CM

## 2021-05-14 PROCEDURE — 3725F SCREEN DEPRESSION PERFORMED: CPT | Performed by: FAMILY MEDICINE

## 2021-05-14 PROCEDURE — 3079F DIAST BP 80-89 MM HG: CPT | Performed by: FAMILY MEDICINE

## 2021-05-14 PROCEDURE — 3008F BODY MASS INDEX DOCD: CPT | Performed by: FAMILY MEDICINE

## 2021-05-14 PROCEDURE — 1036F TOBACCO NON-USER: CPT | Performed by: FAMILY MEDICINE

## 2021-05-14 PROCEDURE — 99214 OFFICE O/P EST MOD 30 MIN: CPT | Performed by: FAMILY MEDICINE

## 2021-05-14 PROCEDURE — 3075F SYST BP GE 130 - 139MM HG: CPT | Performed by: FAMILY MEDICINE

## 2021-05-14 NOTE — PROGRESS NOTES
Assessment/Plan:    1  Acquired hypothyroidism    2  Benign essential hypertension    3  BMI 40 0-44 9, adult ([facility] )    4  Essential hypertriglyceridemia  -     Lipid panel; Future    5  Mixed connective tissue disease ([facility] )    6  Vitamin D deficiency  -     Vitamin D 25 hydroxy; Future    7  Family history of early CAD      BMI Counseling: Body mass index is 40 58 kg/m²  The BMI is above normal  Nutrition recommendations include moderation in carbohydrate intake  Exercise recommendations include exercising 3-5 times per week  No pharmacotherapy was ordered  There are no Patient Instructions on file for this visit  Return in about 4 months (around 9/14/2021) for Recheck  Subjective:      Patient ID: Carmela Johnson is a 52 y o  female  Chief Complaint   Patient presents with    Follow-up    mammogram     scheduled for 7/30/21 with Colleen Valencia PHQ     completed       HPI    HLD:  Elevated on this visit  Has never had any problems in the past   Has not been changing her diet or eating poorly and is still very active  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    SLE (Brief): The patient is being seen for a routine clinic follow-up of systemic lupus erythematosus  Symptoms:  joint pain, but-no fever,-no malaise,-no weight loss-and-no chest pain-   The patient presents with complaints of visual problems (follows with eye doctor every 6 months  )  (Follows with rheumatology  Plaquenil daily, Mobic daily, and Flexeril as needed  )  April 2019, now has been diagnosed not with lupus with mixed connective tissue disease feels worse with barometric pressure elevated but is working fine and not missing work oct 2019 symptoms are better since her busy work season is betetr ow November 2020, no changes and is stable  May 2021, relatively stable  CRP is 8 8 and she has an appointment twice a year with her rheumatologist     Vitamin D Deficiency:  The patient is being seen for follow-up of vitamin D deficiency  Current treatment includes vitamin D3 (cholecalciferol) and 6000 IU per day  Symptoms:  myalgias but-no fatigue,-no brittle nails-and-no depression   April 2019, taking increase higher doses over-the-counter of 8000 international units   Does not want weekly dose at this time  Melodie Bello is 34  Followed by Rheumatology oct 2019 now sees them yearly, due for labs November 2020, vitamin-D level in range now  [facility] 2021, vitamin-D levels in range now and patient states she feels better at this level      Hypertension (Follow-Up): The patient presents for follow-up of essential hypertension  The patient states she has been doing well with her blood pressure control since the last visit  Symptoms: denies impaired vision,-denies dyspnea,-denies chest pain,-denies intermittent leg claudication-and-improved lower extremity edema  Associated symptoms include no headache  Home monitoring: The patient is not checking blood pressure at home  Lifestyle: Diet: She consumes a diverse and healthy diet  Exercise: She exercises regularly  (walk a lot ) 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  Additional History: follows eye doctor every 6 months       Hyperlipidemia (Initial): The patient is being seen for a routine clinic follow-up of hyperlipidemia  Recent measurements for this patient were taken changed fish oil brand  Recent measurements: total cholesterol 182 mg/dL and  mg/dL  Her total cholesterol is decreasing and LDL is increasing  Associated symptoms:  no chest pain,-no exertional calf cramps-and-no nausea-  Oct 2019 due for labs now, last labs are controlled   November 2020, no issues and controlled  The patient presents with complaints of myalgias (attributes to lupus and connective tissue disease)     Hypothyroidism (Follow-Up): The patient is being seen for follow-up of hypothyroidism of undetermined etiology   The patient reports doing well  Interval Events: TSH in 12/16 was 8 64, now on levothyroxine is 2 4    Doing well April 2019, in range oct 2019 labs are yearly now November 2020, stable May 2021, well controlled with no issues  Compliant with medications  Laboratory data and range  Interval symptoms:  improved weight gain,-stable cold intolerance,-stable fatigue-and-denies constipation  Medications:  the patient is adherent to her medication regimen-(Levothyroxine 50mcg )             The following portions of the patient's history were reviewed and updated as appropriate: allergies, current medications, past family history, past medical history, past social history, past surgical history and problem list     Review of Systems      Constitutional:  Denies fever or chills   Eyes:  Denies double or blurry vision  HENT:  Denies nasal congestion or sore throat   Respiratory:  Denies cough or shortness of breath or wheezing  Cardiovascular:  Denies palpitations or chest pain  GI:  Denies abdominal pain, nausea, or vomiting, no loose stools  Integument:  Denies rash , no open areas  Neurologic:  Denies headache or focal weakness, no dizziness        Current Outpatient Medications   Medication Sig Dispense Refill    amLODIPine (NORVASC) 5 mg tablet Take 1 tablet (5 mg total) by mouth daily 90 tablet 1    Ascorbic Acid (VITAMIN C ER) 1000 MG TBCR Take 1,000 mg by mouth daily      Cholecalciferol 2000 units CAPS Take 2,000 Units by mouth 4 (four) times a day       cyclobenzaprine (FLEXERIL) 10 mg tablet Take 1 tablet by mouth daily as needed      hydroxychloroquine (PLAQUENIL) 200 mg tablet Take 2 tablets (400 mg total) by mouth daily for 90 days 180 tablet 1    levothyroxine 50 mcg tablet Take 1 tablet (50 mcg total) by mouth daily 90 tablet 1    meloxicam (MOBIC) 15 mg tablet Take 1 tablet (15 mg total) by mouth daily 90 tablet 1    Multiple Vitamins-Minerals (MULTIVITAMIN ADULT PO) Take 1 capsule by mouth      Omega-3 Fatty Acids (EQL OMEGA 3 FISH OIL) 1400 MG CAPS Take 1 capsule by mouth daily       No current facility-administered medications for this visit          Objective:    /86 (BP Location: Left arm, Patient Position: Sitting, Cuff Size: Standard)   Pulse 96   Temp 98 3 °F (36 8 °C) (Temporal)   Ht 5' 3" (1 6 m)   Wt 104 kg (229 lb 1 6 oz)   SpO2 98%   BMI 40 58 kg/m²        Physical Exam       Constitutional:  Well developed, well nourished, no acute distress, non-toxic appearance   Eyes:  PERRL, conjunctiva normal , non icteric sclera  HENT:  Atraumatic, oropharynx moist  Neck-  supple   Respiratory:  CTA b/l, normal breath sounds, no rales, no wheezing   Cardiovascular:  RRR, no murmurs, no LE edema b/l  GI:  Soft, nondistended, normal bowel sounds x 4, nontender, no organomegaly, no mass, no rebound, no guarding   Neurologic:  no focal deficits noted   Psychiatric:  Speech and behavior appropriate , AAO x 3        Ltanya Hem, DO

## 2021-09-17 ENCOUNTER — RA CDI HCC (OUTPATIENT)
Dept: OTHER | Facility: HOSPITAL | Age: 51
End: 2021-09-17

## 2021-09-17 NOTE — PROGRESS NOTES
E66 01: Morbid (severe) obesity due to excess calories (HCC) -     Per CMS/ICD 10 coding guidelines, BMI of 40 or higher; Class 3 obesity is sometimes categorized as "morbid," "extreme," or "severe" obesity       next visit on 09/24/2021    [facility]  coding opportunities             Chart Reviewed * (Number of) Inbasket suggestions sent to Provider: 1                  Patients insurance company: [employer] (Medicare Advantage and Commercial)             [facility]  coding opportunities             Chart Reviewed * (Number of) Inbasket suggestions sent to Provider: 1               Number of suggestions NOT actually used: 1     Patients insurance company: [employer] ([employer])     Visit status: Patient arrived for their scheduled appointment     Provider never responded to [place] [id number]  coding request

## 2021-09-23 ENCOUNTER — TELEPHONE (OUTPATIENT)
Dept: INTERNAL MEDICINE CLINIC | Facility: CLINIC | Age: 51
End: 2021-09-23

## 2021-09-23 NOTE — TELEPHONE ENCOUNTER
Pt is on your schedule tomorrow, she is unable  to come into office tomorrow can we make it a virtual?

## 2021-09-24 ENCOUNTER — TELEMEDICINE (OUTPATIENT)
Dept: INTERNAL MEDICINE CLINIC | Age: 51
End: 2021-09-24
Payer: COMMERCIAL

## 2021-09-24 ENCOUNTER — TELEPHONE (OUTPATIENT)
Dept: ADMINISTRATIVE | Facility: OTHER | Age: 51
End: 2021-09-24

## 2021-09-24 VITALS — WEIGHT: 229 LBS | BODY MASS INDEX: 40.57 KG/M2 | HEIGHT: 63 IN

## 2021-09-24 DIAGNOSIS — E03.9 HYPOTHYROIDISM, UNSPECIFIED TYPE: ICD-10-CM

## 2021-09-24 DIAGNOSIS — I10 BENIGN ESSENTIAL HYPERTENSION: ICD-10-CM

## 2021-09-24 DIAGNOSIS — E78.1 ESSENTIAL HYPERTRIGLYCERIDEMIA: ICD-10-CM

## 2021-09-24 DIAGNOSIS — M35.1 MIXED CONNECTIVE TISSUE DISEASE (HCC): ICD-10-CM

## 2021-09-24 DIAGNOSIS — E03.9 ACQUIRED HYPOTHYROIDISM: ICD-10-CM

## 2021-09-24 DIAGNOSIS — E55.9 VITAMIN D DEFICIENCY: ICD-10-CM

## 2021-09-24 DIAGNOSIS — I10 HYPERTENSION, UNSPECIFIED TYPE: ICD-10-CM

## 2021-09-24 DIAGNOSIS — Z11.59 NEED FOR HEPATITIS C SCREENING TEST: Primary | ICD-10-CM

## 2021-09-24 PROCEDURE — 3008F BODY MASS INDEX DOCD: CPT | Performed by: FAMILY MEDICINE

## 2021-09-24 PROCEDURE — 99214 OFFICE O/P EST MOD 30 MIN: CPT | Performed by: FAMILY MEDICINE

## 2021-09-24 PROCEDURE — 1036F TOBACCO NON-USER: CPT | Performed by: FAMILY MEDICINE

## 2021-09-24 RX ORDER — LEVOTHYROXINE SODIUM 0.05 MG/1
50 TABLET ORAL DAILY
Qty: 90 TABLET | Refills: 1 | Status: SHIPPED | OUTPATIENT
Start: 2021-09-24

## 2021-09-24 RX ORDER — HYDROXYCHLOROQUINE SULFATE 200 MG/1
400 TABLET, FILM COATED ORAL DAILY
Qty: 180 TABLET | Refills: 1 | Status: SHIPPED | OUTPATIENT
Start: 2021-09-24 | End: 2022-03-23

## 2021-09-24 RX ORDER — AMLODIPINE BESYLATE 5 MG/1
5 TABLET ORAL DAILY
Qty: 90 TABLET | Refills: 1 | Status: SHIPPED | OUTPATIENT
Start: 2021-09-24

## 2021-09-24 RX ORDER — MELOXICAM 15 MG/1
15 TABLET ORAL DAILY
Qty: 90 TABLET | Refills: 1 | Status: SHIPPED | OUTPATIENT
Start: 2021-09-24 | End: 2022-03-18 | Stop reason: SDUPTHER

## 2021-09-24 NOTE — TELEPHONE ENCOUNTER
----- Message from Jesús Ventura sent at 9/24/2021 11:01 AM EDT -----  Regarding: mammogram, pap, HIV - [facility]pc  09/24/21 11:01 AM    Hello, our patient Xenia Vidal has had Mammogram completed/performed  Please assist in updating the patient chart by pulling the Care Everywhere (CE) document  The date of service is 7/30/2021  Thank you,  Jesús [facility]     09/24/21 11:01 AM    Hello, our patient Xenia Vidal has had Pap Smear (HPV) aka Cervical Cancer Screening completed/performed  Please assist in updating the patient chart by pulling the Care Everywhere (CE) document  The date of service is 2/4/2020  Thank you,  Jesús [facility]     09/24/21 11:02 AM    Hello, our patient Xenia Vidal has had HIV completed/performed  Please assist in updating the patient chart by pulling the Care Everywhere (CE) document  The date of service is 8/02/2010       Thank you,  Jesús Ventura  [address] [facility]

## 2021-09-24 NOTE — PROGRESS NOTES
Virtual Regular Visit    Verification of patient location:    Patient is located in the following state in which I hold an active license [place]      Assessment/Plan:    Problem List Items Addressed This Visit        Endocrine    Acquired hypothyroidism    Relevant Medications    levothyroxine 50 mcg tablet    Other Relevant Orders    TSH, 3rd generation with Free T4 reflex    Comprehensive metabolic panel    CBC and differential       Cardiovascular and Mediastinum    Benign essential hypertension    Relevant Medications    amLODIPine (NORVASC) 5 mg tablet       Other    Essential hypertriglyceridemia    Relevant Orders    Lipid panel    Vitamin D deficiency    Mixed connective tissue disease ([facility] )    Relevant Medications    hydroxychloroquine (PLAQUENIL) 200 mg tablet    meloxicam (Mobic) 15 mg tablet    BMI 40 0-44 9, adult ([facility] )      Other Visit Diagnoses     Need for hepatitis C screening test    -  Primary    Relevant Orders    Hepatitis C antibody    Hypertension, unspecified type        Relevant Medications    amLODIPine (NORVASC) 5 mg tablet    Hypothyroidism, unspecified type        Relevant Medications    levothyroxine 50 mcg tablet               Reason for visit is   Chief Complaint   Patient presents with    Follow-up    Hypertension        Encounter provider Jennyfer Luz DO    Provider located at [address] [place] [phone]      Recent Visits  Date Type Provider Dept   09/23/21 Telephone Jennyfer Luz DO [facility]   Showing recent visits within past 7 days and meeting all other requirements  Today's Visits  Date Type Provider Dept   09/24/21 Burak [address], DO [facility]   Showing today's visits and meeting all other requirements  Future Appointments  No visits were found meeting these conditions    Showing future appointments within next 150 days and meeting all other requirements       The patient was identified by name and date of birth  Erik Das was informed that this is a telemedicine visit and that the visit is being conducted through  DIRECTV   She agrees to proceed     My office door was closed  No one else was in the room  She acknowledged consent and understanding of privacy and security of the video platform  The patient has agreed to participate and understands they can discontinue the visit at any time  Patient is aware this is a billable service  Subjective  Erik Das is a 54 y o  female       HPI     Hyperlipidemia : The patient is being seen for a routine clinic follow-up of hyperlipidemia  Recent measurements for this patient were taken changed fish oil brand  Recent measurements: total cholesterol 182 mg/dL and  mg/dL  Her total cholesterol is decreasing and LDL is increasing  Associated symptoms:  no chest pain,-no exertional calf cramps-and-no nausea-  Oct 2019 due for labs now, last labs are controlled   November 2020, no issues and controlled  The patient presents with complaints of myalgias (attributes to lupus and connective tissue disease)  May 2021:  Elevated on this visit  Has never had any problems in the past   Has not been changing her diet or eating poorly and is still very active  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 September 2021, significantly improved lab work  She has been doing of studying and research regarding nutrition because of her mother's cardiac condition they had been eating  She notices she feels better also     SLE (Brief): The patient is being seen for a routine clinic follow-up of systemic lupus erythematosus  Symptoms:  joint pain, but-no fever,-no malaise,-no weight loss-and-no chest pain-   The patient presents with complaints of visual problems (follows with eye doctor every 6 months  )  (Follows with rheumatology   Plaquenil daily, Mobic daily, and Flexeril as needed  )  April 2019, now has been diagnosed not with lupus with mixed connective tissue disease feels worse with barometric pressure elevated but is working fine and not missing work oct 2019 symptoms are better since her busy work season is [employer] 2020, no changes and is stable  May 2021, relatively stable  CRP is 8 8 and she has an appointment twice a year with her rheumatologist  September 2021 no changes     Vitamin D Deficiency: The patient is being seen for follow-up of vitamin D deficiency  Current treatment includes vitamin D3 (cholecalciferol) and 6000 IU per day  Symptoms:  myalgias but-no fatigue,-no brittle nails-and-no depression   April 2019, taking increase higher doses over-the-counter of 8000 international units   Does not want weekly dose at this time  Xenia Vidal is 34  Followed by Rheumatology oct 2019 now sees them yearly, due for labs November 2020, vitamin-D level in range now  [facility] 2021, vitamin-D levels in range now and patient states she feels better at this level   September 2020, taking 10,000 international daily during the week days and nothing on the weekends  Her level was 71 and maintained at 79     Hypertension (Follow-Up): The patient presents for follow-up of essential hypertension  The patient states she has been doing well with her blood pressure control since the last visit  Symptoms: denies impaired vision,-denies dyspnea,-denies chest pain,-denies intermittent leg claudication-and-improved lower extremity edema  Associated symptoms include no headache  Home monitoring: The patient is not checking blood pressure at home  Lifestyle: Diet: She consumes a diverse and healthy diet  Exercise: She exercises regularly  (walk a lot ) 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  Additional History: follows eye doctor every 6 months            Hypothyroidism (Follow-Up):  The patient is being seen for follow-up of hypothyroidism of undetermined etiology  The patient reports doing well  Interval Events: TSH in 12/16 was 8 64, now on levothyroxine is 2 4    Doing well April 2019, in range oct 2019 labs are yearly now November 2020, stable May 2021, well controlled with no issues  Compliant with medications  Laboratory data and range  Interval symptoms:  improved weight gain,-stable cold intolerance,-stable fatigue-and-denies constipation  Medications:  the patient is adherent to her medication regimen-(Levothyroxine 50mcg )          Past Medical History:   Diagnosis Date    Arthralgia     diffuse    BMI 40 0-44 9, adult ([facility] ) 10/29/2019    Disease of thyroid gland     Hypertension     Lupus ([facility] ) 10/19/2015    Mucositis        Past Surgical History:   Procedure Laterality Date    WISDOM TOOTH EXTRACTION         Current Outpatient Medications   Medication Sig Dispense Refill    amLODIPine (NORVASC) 5 mg tablet Take 1 tablet (5 mg total) by mouth daily 90 tablet 1    Ascorbic Acid (VITAMIN C ER) 1000 MG TBCR Take 1,000 mg by mouth daily      Cholecalciferol 2000 units CAPS Take 2,000 Units by mouth 4 (four) times a day       cyclobenzaprine (FLEXERIL) 10 mg tablet Take 1 tablet by mouth daily as needed      hydroxychloroquine (PLAQUENIL) 200 mg tablet Take 2 tablets (400 mg total) by mouth daily 180 tablet 1    levothyroxine 50 mcg tablet Take 1 tablet (50 mcg total) by mouth daily 90 tablet 1    meloxicam (Mobic) 15 mg tablet Take 1 tablet (15 mg total) by mouth daily 90 tablet 1    Multiple Vitamins-Minerals (MULTIVITAMIN ADULT PO) Take 1 capsule by mouth      Omega-3 Fatty Acids (EQL OMEGA 3 FISH OIL) 1400 MG CAPS Take 1 capsule by mouth daily       No current facility-administered medications for this visit          No Known Allergies    Review of Systems     Constitutional:  Denies fever or chills   Eyes:  Denies double or blurry vision  HENT:  Denies nasal congestion or sore throat   Respiratory:  Denies cough or shortness of breath or wheezing  Cardiovascular:  Denies palpitations or chest pain  GI:  Denies abdominal pain, nausea, or vomiting, no loose stools, no reflux  Integument:  Denies rash , no open areas  Neurologic:  Denies headache or focal weakness, no dizziness  : no dysuria      Video Exam    Vitals:    09/24/21 1118   Weight: 104 kg (229 lb)   Height: 5' 3" (1 6 m)       Physical Exam     Constitutional:  Well developed, well nourished, no acute distress, non-toxic appearance   Eyes:  PERRL, conjunctiva normal , non icteric sclera  HENT:  Atraumatic, oropharynx moist  Neck-  supple   Respiratory:  CTA b/l, normal breath sounds, no rales, no wheezing   Cardiovascular:  RRR, no murmurs, no LE edema b/l  GI:  Soft, nondistended, normal bowel sounds x 4, nontender, no organomegaly, no mass, no rebound, no guarding   Neurologic:  no focal deficits noted   Psychiatric:  Speech and behavior appropriate , AAO x 3        I spent 8 minutes directly with the patient during this visit    1 S Jamar Valencia verbally agrees to participate in [employer]  Pt is aware that [employer] could be limited without vital signs or the ability to perform a full hands-on physical [facility] understands she or the provider may request at any time to terminate the video visit and request the patient to seek care or treatment in person

## 2021-09-24 NOTE — TELEPHONE ENCOUNTER
----- Message from Homer Osuna, [address] sent at 9/24/2021 11:17 AM EDT -----  Regarding: Breast [address] [place]  Contact: [phone]  09/24/21 11:17 AM    Hello, our patient Joselyn Bee has had Mammogram completed/performed  Please assist in updating the patient chart by pulling the Care Everywhere (CE) document  The date of service is 7/30/21       Thank you,  Homer Osuna MA  [facility] [address] [facility]

## 2021-09-24 NOTE — TELEPHONE ENCOUNTER
Upon review of the In Basket request we were able to locate, review, and update the patient chart as requested for HIV, Mammogram and Pap Smear (HPV) aka Cervical Cancer Screening  Any additional questions or concerns should be emailed to the Practice Liaisons via [email or web address] email, please do not reply via In Basket      Thank you  Maritza Hernandez

## 2022-01-14 ENCOUNTER — OFFICE VISIT (OUTPATIENT)
Dept: URGENT CARE | Facility: MEDICAL CENTER | Age: 52
End: 2022-01-14
Payer: COMMERCIAL

## 2022-01-14 VITALS
SYSTOLIC BLOOD PRESSURE: 146 MMHG | HEART RATE: 97 BPM | TEMPERATURE: 98.3 F | OXYGEN SATURATION: 96 % | RESPIRATION RATE: 18 BRPM | DIASTOLIC BLOOD PRESSURE: 83 MMHG

## 2022-01-14 DIAGNOSIS — S89.92XA INJURY OF LEFT KNEE, INITIAL ENCOUNTER: Primary | ICD-10-CM

## 2022-01-14 DIAGNOSIS — S83.92XA SPRAIN OF LEFT KNEE, UNSPECIFIED LIGAMENT, INITIAL ENCOUNTER: ICD-10-CM

## 2022-01-14 PROCEDURE — S9083 URGENT CARE CENTER GLOBAL: HCPCS | Performed by: PHYSICIAN ASSISTANT

## 2022-01-14 PROCEDURE — G0382 LEV 3 HOSP TYPE B ED VISIT: HCPCS | Performed by: PHYSICIAN ASSISTANT

## 2022-01-14 NOTE — PROGRESS NOTES
[address]        NAME: Roger Machado is a 54 y o  female  : 1970    MRN: [id number]  DATE: 2022  TIME: 8:57 AM    Assessment and Plan   Injury of left knee, initial encounter [S89 [id number]]  1  Injury of left knee, initial encounter  XR knee 4+ vw left injury   2  Sprain of left knee, unspecified ligament, initial encounter           Patient Instructions           Chief Complaint     Chief Complaint   Patient presents with    Knee Injury     pt was rushing around, was going up steps and heard crack and pop  pain to back of left knee  decribes pain as throbbing and diff  to bare weight  History of Present Illness       Patient relates sudden onset of left posterior knee pain last night while descending steps  States that the onset of pain was immediately proceeded by a crunching sensation  Patient had mild intermittent pain in the left knee prior but nothing this severe  She relates any fall  She is unable to weight bear at this point without the assistance of crutches which she has  No swelling noted no paresthesias no hip or ankle pain  Review of Systems   Review of Systems   Constitutional: Negative for chills and fever  HENT: Negative for ear pain and sore throat  Eyes: Negative for pain and visual disturbance  Respiratory: Negative for cough and shortness of breath  Cardiovascular: Negative for chest pain and palpitations  Gastrointestinal: Negative for abdominal pain and vomiting  Genitourinary: Negative for dysuria and hematuria  Musculoskeletal: Positive for arthralgias  Negative for back pain  Skin: Negative for color change and rash  Neurological: Negative for seizures and syncope  All other systems reviewed and are negative          Current Medications       Current Outpatient Medications:     amLODIPine (NORVASC) 5 mg tablet, Take 1 tablet (5 mg total) by mouth daily, Disp: 90 tablet, Rfl: 1    Ascorbic Acid (VITAMIN C ER) 1000 MG TBCR, Take 1,000 mg by mouth daily, Disp: , Rfl:     Cholecalciferol 2000 units CAPS, Take 2,000 Units by mouth 4 (four) times a day , Disp: , Rfl:     cyclobenzaprine (FLEXERIL) 10 mg tablet, Take 1 tablet by mouth daily as needed, Disp: , Rfl:     hydroxychloroquine (PLAQUENIL) 200 mg tablet, Take 2 tablets (400 mg total) by mouth daily, Disp: 180 tablet, Rfl: 1    levothyroxine 50 mcg tablet, Take 1 tablet (50 mcg total) by mouth daily, Disp: 90 tablet, Rfl: 1    meloxicam (Mobic) 15 mg tablet, Take 1 tablet (15 mg total) by mouth daily, Disp: 90 tablet, Rfl: 1    Multiple Vitamins-Minerals (MULTIVITAMIN ADULT PO), Take 1 capsule by mouth, Disp: , Rfl:     Omega-3 Fatty Acids (EQL OMEGA 3 FISH OIL) 1400 MG CAPS, Take 1 capsule by mouth daily, Disp: , Rfl:     Current Allergies     Allergies as of 01/14/2022    (No Known Allergies)            The following portions of the patient's history were reviewed and updated as appropriate: allergies, current medications, past family history, past medical history, past social history, past surgical history and problem list      Past Medical History:   Diagnosis Date    Arthralgia     diffuse    BMI 40 0-44 9, adult ([facility] ) 10/29/2019    Disease of thyroid gland     Hypertension     Lupus ([facility] ) 10/19/2015    Mucositis        Past Surgical History:   Procedure Laterality Date    WISDOM TOOTH EXTRACTION         Family History   Problem Relation Age of Onset    Glaucoma Mother     Diabetes Mother     Hypertension Father          Medications have been verified  Objective   /83   Pulse 97   Temp 98 3 °F (36 8 °C)   Resp 18   SpO2 96%        Physical Exam     Physical Exam  Constitutional:       Appearance: Normal appearance  HENT:      Head: Normocephalic  Nose: Nose normal    Eyes:      Conjunctiva/sclera: Conjunctivae normal       Pupils: Pupils are equal, round, and reactive to light  Cardiovascular:      Rate and Rhythm: Normal rate  Pulmonary:      Effort: Pulmonary effort is normal    Musculoskeletal:      Cervical back: Normal range of motion  Legs:    Neurological:      Mental Status: She is alert

## 2022-01-14 NOTE — PATIENT INSTRUCTIONS
1  Apply ice and elevate  2  Increase your daily meloxicam to 15 mg once daily  For the next 5 days  3   Follow-up with orthopedics in 7-10 days if her symptoms do not gradually improve  4  Use the crutches as discussed as needed  Knee Pain   WHAT YOU NEED TO KNOW:   Knee pain may start suddenly, or it may be a long-term problem  You may have pain on the side, front, or back of your knee  You may have knee stiffness and swelling  You may hear popping sounds or feel like your knee is giving way or locking up as you walk  You may feel pain when you sit, stand, walk, or climb up and down stairs  Knee pain can be caused by conditions such as obesity, inflammation, or strains or tears in ligaments or tendons  DISCHARGE INSTRUCTIONS:   Return to the emergency department if:   · Your pain is worse, even after treatment  · You cannot bend or straighten your leg completely  · The swelling around your knee does not go down even with treatment  · Your knee is painful and hot to the touch  Contact your healthcare provider if:   · You have questions or concerns about your condition or care  Medicines: You may need any of the following:  · NSAIDs  help decrease swelling and pain or fever  This medicine is available with or without a doctor's order  NSAIDs can cause stomach bleeding or kidney problems in certain people  If you take blood thinner medicine, always ask your healthcare provider if NSAIDs are safe for you  Always read the medicine label and follow directions  · Acetaminophen  decreases pain and fever  It is available without a doctor's order  Ask how much to take and how often to take it  Follow directions  Read the labels of all other medicines you are using to see if they also contain acetaminophen, or ask your doctor or pharmacist  Acetaminophen can cause liver damage if not taken correctly  Do not use more than 4 grams (4,000 milligrams) total of acetaminophen in one day  · Prescription pain medicine  may be given  Ask your healthcare provider how to take this medicine safely  Some prescription pain medicines contain acetaminophen  Do not take other medicines that contain acetaminophen without talking to your healthcare provider  Too much acetaminophen may cause liver damage  Prescription pain medicine may cause constipation  Ask your healthcare provider how to prevent or treat constipation  · Take your medicine as directed  Contact your healthcare provider if you think your medicine is not helping or if you have side effects  Tell him or her if you are allergic to any medicine  Keep a list of the medicines, vitamins, and herbs you take  Include the amounts, and when and why you take them  Bring the list or the pill bottles to follow-up visits  Carry your medicine list with you in case of an emergency  What you can do to manage your symptoms:   · Rest your knee so it can heal   Limit activities that increase your pain  Do low-impact exercises, such as walking or swimming  · Apply ice to help reduce swelling and pain  Use an ice pack, or put crushed ice in a plastic bag  Cover it with a towel before you apply it to your knee  Apply ice for 15 to 20 minutes every hour, or as directed  · Apply compression to help reduce swelling  Use a brace or bandage only as directed  · Elevate your knee to help decrease pain and swelling  Elevate your knee while you are sitting or lying down  Prop your leg on pillows to keep your knee above the level of your heart  · Prevent your knee from moving as directed  Your healthcare provider may put on a cast or splint  You may need to wear a leg brace to stabilize your knee  A leg brace can be adjusted to increase your range of motion as your knee heals  What you can do to prevent knee pain:   · Maintain a healthy weight  Extra weight increases your risk for knee pain   Ask your healthcare provider how much you should weigh  He or she can help you create a safe weight loss plan if you need to lose weight  · Exercise or train properly  Use the correct equipment for sports  Wear shoes that provide good support  Check your posture often as you exercise, play sports, or train for an event  This can help prevent stress and strain on your knees  Rest between sessions so you do not overwork your knees  Follow up with your healthcare provider within 24 hours or as directed: You may need follow-up treatments, such as steroid injections to decrease pain  Write down your questions so you remember to ask them during your visits  © Copyright [employer] 2021 Information is for End User's use only and may not be sold, redistributed or otherwise used for commercial purposes  All illustrations and images included in CareNotes® are the copyrighted property of A D A M , Inc  or Ana Luisa Cash  The above information is an  only  It is not intended as medical advice for individual conditions or treatments  Talk to your doctor, nurse or pharmacist before following any medical regimen to see if it is safe and effective for you

## 2022-01-22 ENCOUNTER — OFFICE VISIT (OUTPATIENT)
Dept: OBGYN CLINIC | Facility: MEDICAL CENTER | Age: 52
End: 2022-01-22
Payer: COMMERCIAL

## 2022-01-22 VITALS
BODY MASS INDEX: 39.87 KG/M2 | HEART RATE: 90 BPM | SYSTOLIC BLOOD PRESSURE: 149 MMHG | WEIGHT: 225 LBS | HEIGHT: 63 IN | DIASTOLIC BLOOD PRESSURE: 94 MMHG

## 2022-01-22 DIAGNOSIS — S89.92XA INJURY OF LEFT KNEE, INITIAL ENCOUNTER: Primary | ICD-10-CM

## 2022-01-22 DIAGNOSIS — M17.12 PRIMARY OSTEOARTHRITIS OF LEFT KNEE: ICD-10-CM

## 2022-01-22 DIAGNOSIS — M25.562 ACUTE PAIN OF LEFT KNEE: ICD-10-CM

## 2022-01-22 PROCEDURE — 1036F TOBACCO NON-USER: CPT | Performed by: EMERGENCY MEDICINE

## 2022-01-22 PROCEDURE — 3008F BODY MASS INDEX DOCD: CPT | Performed by: EMERGENCY MEDICINE

## 2022-01-22 PROCEDURE — 99203 OFFICE O/P NEW LOW 30 MIN: CPT | Performed by: EMERGENCY MEDICINE

## 2022-01-22 NOTE — PATIENT INSTRUCTIONS
While taking meloxicam do not take any other NSAIDs such as Advil, Motrin, ibuprofen, naproxen or Aleve  However you may take Tylenol

## 2022-01-22 NOTE — PROGRESS NOTES
Assessment/Plan:    Diagnoses and all orders for this visit:    Injury of left knee, initial encounter  -     Ambulatory referral to Orthopedic Surgery  -     Ambulatory Referral to Physical Therapy; Future    Acute pain of left knee  -     Ambulatory referral to Orthopedic Surgery  -     Ambulatory Referral to Physical Therapy; Future    Primary osteoarthritis of left knee  -     Ambulatory Referral to Physical Therapy; Future      Continue Mobic, WBAT with crutches as needed, start PT  If no improvement t/c MRI, and possible subsequent steroid injection    Return in about 5 weeks (around 2/26/2022)  Chief Complaint:     Chief Complaint   Patient presents with    Left Knee - Pain       Subjective:   Patient ID: Rachel Simmons is a 54 y o  female  1/13/22  NP presents for left knee injury and a 'crack' hurrying up the stairs c/o pain which is anterior medial and posterior she had trouble WB was evaluated at [facility] care Xrays obtained and reviewed using crutches notes improving symptoms taking Mobic      Review of Systems    The following portions of the patient's chart were reviewed and updated as appropriate:    Allergy:  No Known Allergies      Past Medical History:   Diagnosis Date    Arthralgia     diffuse    BMI 40 0-44 9, adult ([facility] ) 10/29/2019    Disease of thyroid gland     Hypertension     Lupus ([facility] ) 10/19/2015    Mucositis        Past Surgical History:   Procedure Laterality Date    WISDOM TOOTH EXTRACTION         Social History     Socioeconomic History    Marital status: /Civil Union     Spouse name: Not on file    Number of children: 0    Years of education: Not on file    Highest education level: Not on file   Occupational History    Occupation: CNA   Tobacco Use    Smoking status: Never Smoker    Smokeless tobacco: Never Used   Vaping Use    Vaping Use: Never used   Substance and Sexual Activity    Alcohol use: Yes     Comment: social    Drug use: No    Sexual activity: Not on file   Other Topics Concern    Not on file   Social History Narrative    Does not exercise    No known STD risk factors     Social Determinants of Health     Financial Resource Strain: Not on file   Food Insecurity: Not on file   Transportation Needs: Not on file   Physical Activity: Not on file   Stress: Not on file   Social Connections: Not on file   Intimate Partner Violence: Not on file   Housing Stability: Not on file       Family History   Problem Relation Age of Onset    Glaucoma Mother     Diabetes Mother     Hypertension Father        Medications:    Current Outpatient Medications:     amLODIPine (NORVASC) 5 mg tablet, Take 1 tablet (5 mg total) by mouth daily, Disp: 90 tablet, Rfl: 1    Ascorbic Acid (VITAMIN C ER) 1000 MG TBCR, Take 1,000 mg by mouth daily, Disp: , Rfl:     Cholecalciferol 2000 units CAPS, Take 2,000 Units by mouth 4 (four) times a day , Disp: , Rfl:     cyclobenzaprine (FLEXERIL) 10 mg tablet, Take 1 tablet by mouth daily as needed, Disp: , Rfl:     hydroxychloroquine (PLAQUENIL) 200 mg tablet, Take 2 tablets (400 mg total) by mouth daily, Disp: 180 tablet, Rfl: 1    levothyroxine 50 mcg tablet, Take 1 tablet (50 mcg total) by mouth daily, Disp: 90 tablet, Rfl: 1    meloxicam (Mobic) 15 mg tablet, Take 1 tablet (15 mg total) by mouth daily, Disp: 90 tablet, Rfl: 1    Multiple Vitamins-Minerals (MULTIVITAMIN ADULT PO), Take 1 capsule by mouth, Disp: , Rfl:     Omega-3 Fatty Acids (EQL OMEGA 3 FISH OIL) 1400 MG CAPS, Take 1 capsule by mouth daily, Disp: , Rfl:     Patient Active Problem List   Diagnosis    Benign essential hypertension    Essential hypertriglyceridemia    Fatigue    Vitamin D deficiency    Acquired hypothyroidism    [facility] )    BMI 40 0-44 9, adult ([facility] )    Family history of early CAD       Objective:  /94   Pulse 90   Ht 5' 3" (1 6 m)   Wt 102 kg (225 lb)   BMI 39 86 kg/m²     Left Knee Exam Tenderness   The patient is experiencing tenderness in the medial joint line and pes anserinus  Range of Motion   Extension: normal   Flexion: abnormal     Tests   Hal:  Medial - negative Lateral - negative  Varus: negative Valgus: negative  Lachman:  Anterior - negative                Physical Exam  Musculoskeletal:      Left knee:      Instability Tests: Medial Hal test negative and lateral Hal test negative  Neurologic Exam    Procedures    I have personally reviewed pertinent films in PACS and my interpretation is   Xrays Left knee with mild degenerative changes no acute findings such as fracture or effusion

## 2022-01-26 ENCOUNTER — EVALUATION (OUTPATIENT)
Dept: PHYSICAL THERAPY | Age: 52
End: 2022-01-26
Payer: COMMERCIAL

## 2022-01-26 DIAGNOSIS — M25.562 ACUTE PAIN OF LEFT KNEE: ICD-10-CM

## 2022-01-26 DIAGNOSIS — S89.92XA INJURY OF LEFT KNEE, INITIAL ENCOUNTER: ICD-10-CM

## 2022-01-26 DIAGNOSIS — M17.12 PRIMARY OSTEOARTHRITIS OF LEFT KNEE: ICD-10-CM

## 2022-01-26 PROCEDURE — 97110 THERAPEUTIC EXERCISES: CPT | Performed by: PHYSICAL THERAPIST

## 2022-01-26 PROCEDURE — 97161 PT EVAL LOW COMPLEX 20 MIN: CPT | Performed by: PHYSICAL THERAPIST

## 2022-01-26 NOTE — PROGRESS NOTES
PT Evaluation     Today's date: 2022  Patient name: Gosia Rose  : 1970  MRN: [id number]  Referring provider: Ruth Ann Pardo MD  Dx: No diagnosis found  Assessment  Assessment details: Patient presents complaining of L knee pain, which has been ongoing for about a year when her dog was running towards her and caused her to move quickly out of the way, this pain faded away after a couple weeks  Then a week ago when taking care of her mother she began feeling achy and tripped up the steps and felt a "pop and crunch"  She felt immediate pain and swelling, as well as some difficulty  She reports the pain is worse worse with turning or twisting, as well as prolonged walking     She locates her pain grossly to her L knee, denies any referred pain into her L lower leg     She had x-rays performed which were (-)     She is currently working at a [employer], standing and moving for most of her day  Patient presents with limitations in L knee strength, and has some s/s consistent with a possible medial meniscus tear  Patient would benefit from skilled physical therapy to address limitations and deficits  Patient provided with HEP  Patient made aware of condition as well as the proposed treatment plan, including risks, benefits and alternatives  Impairments: abnormal or restricted ROM, activity intolerance, impaired physical strength, lacks appropriate home exercise program and pain with function     Prognosis: good    Goals  ST- demonstrate compliancy with HEP in 1 week     Decrease reported pain to 5/10 at worst and with activity, to improve functional capacity, within 3 weeks   Improve L knee range of motion to normal with no complaints of pain, within 3 weeks     LT- Improve FOTO score to specified value to improve patients perceived benefit of therapy, in 8 weeks   Improve L knee strength to 4+, to improve ability to complete ADL's at home, within 8 weeks   Return to previous recreational activity for continued weight loss, within 10 weeks     Plan  Plan details: Physical therapy with focus on there ex and manual therapy to improve ability to complete tasks around the house and complete functional activities, use of modalities as needed  Patient would benefit from: skilled physical therapy  Referral necessary: No  Planned modality interventions: cryotherapy, TENS and thermotherapy: hydrocollator packs  Planned therapy interventions: ADL training, balance, balance/weight bearing training, gait training, manual therapy, joint mobilization, neuromuscular re-education, strengthening, stretching, therapeutic activities and therapeutic exercise  Frequency: 2x week  Duration in weeks: 12  Plan of Care beginning date: 2022  Plan of Care expiration date: 2022  Treatment plan discussed with: patient        Subjective Evaluation    History of Present Illness  Date of onset: 2022  Mechanism of injury: Trip up steps   Pain  Current pain ratin  At best pain ratin  At worst pain rating: 10  Location: L knee   Quality: sharp and dull ache  Relieving factors: ice  Aggravating factors: sitting, stair climbing, walking and standing  Progression: improved      Diagnostic Tests  X-ray: normal  Treatments  Current treatment: medication  Patient Goals  Patient goals for therapy: decreased pain, independence with ADLs/IADLs and return to sport/leisure activities  Patient goal: continued weight loss         Objective     General Comments:      Knee Comments  Ttp along medial joint line > lateral     rom  Some flexibility limitations in her L quad 2ndary to p!   L knee 2-122*  R knee WFL     mmt  Hip flexion L=4 R=4  Hip abduction L=4 R=4+  Hip adduction L=4 R=4  Knee extension L=4 R=4+  Knee flexion L=4+ R=4+    Special tests  [facility] husain (+)   [facility] (-), all ligamentous testing     Joint play  TF hypomobile in posterior and anterior direction   Patellar unremarkable             Precautions: HTN      Manuals             L knee ROM             L knee JM post/inf                                       Neuro Re-Ed             QS             SLR 3-way             Bridges              S/L clamshells              Vigor              Leg press  nv                        Ther Ex             Nustep             Hip 3-way              Side steps                                                                               Ther Activity                                       Gait Training                                       Modalities

## 2022-02-02 ENCOUNTER — OFFICE VISIT (OUTPATIENT)
Dept: PHYSICAL THERAPY | Age: 52
End: 2022-02-02
Payer: COMMERCIAL

## 2022-02-02 DIAGNOSIS — M25.562 ACUTE PAIN OF LEFT KNEE: ICD-10-CM

## 2022-02-02 DIAGNOSIS — M17.12 PRIMARY OSTEOARTHRITIS OF LEFT KNEE: ICD-10-CM

## 2022-02-02 DIAGNOSIS — S89.92XA INJURY OF LEFT KNEE, INITIAL ENCOUNTER: Primary | ICD-10-CM

## 2022-02-02 PROCEDURE — 97140 MANUAL THERAPY 1/> REGIONS: CPT

## 2022-02-02 PROCEDURE — 97112 NEUROMUSCULAR REEDUCATION: CPT

## 2022-02-02 PROCEDURE — 97110 THERAPEUTIC EXERCISES: CPT

## 2022-02-02 NOTE — PROGRESS NOTES
Daily Note     Today's date: 2022  Patient name: Carmela Johnson  : 1970  MRN: [id number]  Referring provider: Celeste Diamond MD  Dx:   Encounter Diagnosis     ICD-10-CM    1  Injury of left knee, initial encounter  S89  92XA    2  Acute pain of left knee  M25 562    3  Primary osteoarthritis of left knee  M17 12                   Subjective: pt reports HEP compliance with min discomfort      Objective: See treatment diary below    Pt ed for updated HEP  Assessment: ex progressions as per Pt POC, pt Tolerated treatment well  Patient would benefit from continued PT,   Pt has good understanding of HEP progressions    Plan: Continue per plan of care  Progress treatment as tolerated         Precautions: HTN      Manuals 22            L knee ROM VK            L knee JM post/inf                                       Neuro Re-Ed             QS 3x10x5"            SLR 3-way 2x10 ea            Bridges  2x10x3"            S/L clamshells  2x10x3"            Vigor              Leg press  nv                        Ther Ex             Nustep 10'            Hip 3-way  2x10 ea            Side steps  4x20'                                                                             Ther Activity                                       Gait Training                                       Modalities

## 2022-02-09 ENCOUNTER — OFFICE VISIT (OUTPATIENT)
Dept: PHYSICAL THERAPY | Age: 52
End: 2022-02-09
Payer: COMMERCIAL

## 2022-02-09 DIAGNOSIS — S89.92XA INJURY OF LEFT KNEE, INITIAL ENCOUNTER: Primary | ICD-10-CM

## 2022-02-09 DIAGNOSIS — M25.562 ACUTE PAIN OF LEFT KNEE: ICD-10-CM

## 2022-02-09 DIAGNOSIS — M17.12 PRIMARY OSTEOARTHRITIS OF LEFT KNEE: ICD-10-CM

## 2022-02-09 PROCEDURE — 97110 THERAPEUTIC EXERCISES: CPT

## 2022-02-09 PROCEDURE — 97112 NEUROMUSCULAR REEDUCATION: CPT

## 2022-02-09 NOTE — PROGRESS NOTES
Daily Note     Today's date: 2022  Patient name: Stanislaw White  : 1970  MRN: [id number]  Referring provider: Kermit Lindsey MD  Dx:   Encounter Diagnosis     ICD-10-CM    1  Injury of left knee, initial encounter  S89  92XA    2  Acute pain of left knee  M25 562    3  Primary osteoarthritis of left knee  M17 12                   Subjective: pt reports she's been more sore past few days but she's unsure if it's from her injury or due to her other medical conditions      Objective: See treatment diary below      Assessment: ex progressions challenging but alexis well, L quad recruitment slowly improving but pt still has difficulty at times and tends to hyperextend knee, slight antalgic gait noted today       Plan: Continue per plan of care  Progress treatment as tolerated         Precautions: HTN      Manuals 22           L knee ROM VK VK           L knee JM post/inf                                       Neuro Re-Ed  22           QS 3x10x5" 15x10"           SLR 3-way 2x10 ea 2-3x 10 ea           Bridges  2x10x3" 2x10x3"           S/L clamshells  2x10x3" 2x10x3"           Vigor   np           Leg press  nv                        Ther Ex             Nustep 10' 10'           Hip 3-way  2x10 ea 2x10 ea L only           Side steps  4x20' 4x20'           [address]  Strap 5x10"                                                               Ther Activity                                       Gait Training                                       Modalities

## 2022-02-16 ENCOUNTER — OFFICE VISIT (OUTPATIENT)
Dept: PHYSICAL THERAPY | Age: 52
End: 2022-02-16
Payer: COMMERCIAL

## 2022-02-16 DIAGNOSIS — S89.92XA INJURY OF LEFT KNEE, INITIAL ENCOUNTER: Primary | ICD-10-CM

## 2022-02-16 DIAGNOSIS — M17.12 PRIMARY OSTEOARTHRITIS OF LEFT KNEE: ICD-10-CM

## 2022-02-16 DIAGNOSIS — M25.562 ACUTE PAIN OF LEFT KNEE: ICD-10-CM

## 2022-02-16 PROCEDURE — 97110 THERAPEUTIC EXERCISES: CPT

## 2022-02-16 PROCEDURE — 97112 NEUROMUSCULAR REEDUCATION: CPT

## 2022-02-16 PROCEDURE — 97140 MANUAL THERAPY 1/> REGIONS: CPT

## 2022-02-16 NOTE — PROGRESS NOTES
Daily Note     Today's date: 2022  Patient name: Moses Rdz  : 1970  MRN: [id number]  Referring provider: Hawk Connell MD  Dx:   Encounter Diagnosis     ICD-10-CM    1  Injury of left knee, initial encounter  S89  92XA    2  Acute pain of left knee  M25 562    3  Primary osteoarthritis of left knee  M17 12                   Subjective: pt reports increased pain past 2 days pt reports coming down steps and dog came running down and pt twisted L knee , pt reports feelings of knee instability during bridge ex      Objective: See treatment diary below      Assessment:  TTP L medial knee, decreased ex due to increased pain, will monitor sx and progress as able      Plan: Continue per plan of care  Progress treatment as tolerated    pt sees  22     Precautions: HTN      Manuals 22          L knee ROM VK VK VK gentle          L knee JM post/inf                                       Neuro Re-Ed  22           QS 3x10x5" 15x10" 2x10x5"          SLR 3-way 2x10 ea 2-3x 10 ea 2x10 ea          Bridges  2x10x3" 2x10x3" pain          S/L clamshells  2x10x3" 2x10x3" 2x10x3"          Vigor   np np          Leg press  nv np                       Ther Ex             Nustep 10' 10' 10'          Hip 3-way  2x10 ea 2x10 ea L only 2x10 ea L only          Side steps  4x20' 4x20' np          [address]  Strap 5x10" np                                                              Ther Activity                                       Gait Training                                       Modalities

## 2022-02-22 ENCOUNTER — OFFICE VISIT (OUTPATIENT)
Dept: PHYSICAL THERAPY | Age: 52
End: 2022-02-22
Payer: COMMERCIAL

## 2022-02-22 DIAGNOSIS — S89.92XA INJURY OF LEFT KNEE, INITIAL ENCOUNTER: Primary | ICD-10-CM

## 2022-02-22 DIAGNOSIS — M25.562 ACUTE PAIN OF LEFT KNEE: ICD-10-CM

## 2022-02-22 PROCEDURE — 97140 MANUAL THERAPY 1/> REGIONS: CPT | Performed by: PHYSICAL THERAPIST

## 2022-02-22 PROCEDURE — 97112 NEUROMUSCULAR REEDUCATION: CPT | Performed by: PHYSICAL THERAPIST

## 2022-02-22 PROCEDURE — 97110 THERAPEUTIC EXERCISES: CPT | Performed by: PHYSICAL THERAPIST

## 2022-02-22 NOTE — PROGRESS NOTES
Daily Note     Today's date: 2022  Patient name: Roger Machado  : 1970  MRN: [id number]  Referring provider: Claire Antonio MD  Dx:   Encounter Diagnosis     ICD-10-CM    1  Injury of left knee, initial encounter  S89  92XA    2  Acute pain of left knee  M25 562                   Subjective: Reports no change in status coming in today, just a little less stiff  Objective: See treatment diary below      Assessment: Tolerated treatment well  Patient would benefit from continued PT, will be following up with MD on Saturday and will evaluate options from there  Plan: Continue per plan of care  Precautions: HTN      Manuals 22          L knee ROM VK VK VK gentle CW         L knee JM post/inf    CW                                    Neuro Re-Ed  22           QS 3x10x5" 15x10" 2x10x5" 2x10x5"          SLR 3-way 2x10 ea 2-3x 10 ea 2x10 ea 3x10 ea          Bridges  2x10x3" 2x10x3" pain P!           S/L clamshells  2x10x3" 2x10x3" 2x10x3" 20x3" YTB         Vigor   np np          Leg press  nv np                       Ther Ex             Nustep 10' 10' 10' 10'          Hip 3-way  2x10 ea 2x10 ea L only 2x10 ea L only 30x ea L          Side steps  4x20' 4x20' np          [address]  Strap 5x10" np 10x10"                                                              Ther Activity                                       Gait Training                                       Modalities

## 2022-02-26 ENCOUNTER — OFFICE VISIT (OUTPATIENT)
Dept: OBGYN CLINIC | Facility: MEDICAL CENTER | Age: 52
End: 2022-02-26
Payer: COMMERCIAL

## 2022-02-26 VITALS
HEIGHT: 63 IN | DIASTOLIC BLOOD PRESSURE: 89 MMHG | HEART RATE: 105 BPM | SYSTOLIC BLOOD PRESSURE: 139 MMHG | WEIGHT: 223.4 LBS | BODY MASS INDEX: 39.58 KG/M2

## 2022-02-26 DIAGNOSIS — M25.562 ACUTE PAIN OF LEFT KNEE: ICD-10-CM

## 2022-02-26 DIAGNOSIS — S89.92XD INJURY OF LEFT KNEE, SUBSEQUENT ENCOUNTER: Primary | ICD-10-CM

## 2022-02-26 DIAGNOSIS — M17.12 PRIMARY OSTEOARTHRITIS OF LEFT KNEE: ICD-10-CM

## 2022-02-26 PROCEDURE — 1036F TOBACCO NON-USER: CPT | Performed by: EMERGENCY MEDICINE

## 2022-02-26 PROCEDURE — 20610 DRAIN/INJ JOINT/BURSA W/O US: CPT | Performed by: EMERGENCY MEDICINE

## 2022-02-26 PROCEDURE — 99212 OFFICE O/P EST SF 10 MIN: CPT | Performed by: EMERGENCY MEDICINE

## 2022-02-26 PROCEDURE — 3008F BODY MASS INDEX DOCD: CPT | Performed by: EMERGENCY MEDICINE

## 2022-02-26 RX ORDER — LIDOCAINE HYDROCHLORIDE 10 MG/ML
4 INJECTION, SOLUTION INFILTRATION; PERINEURAL
Status: COMPLETED | OUTPATIENT
Start: 2022-02-26 | End: 2022-02-26

## 2022-02-26 RX ORDER — TRIAMCINOLONE ACETONIDE 40 MG/ML
40 INJECTION, SUSPENSION INTRA-ARTICULAR; INTRAMUSCULAR
Status: COMPLETED | OUTPATIENT
Start: 2022-02-26 | End: 2022-02-26

## 2022-02-26 RX ADMIN — LIDOCAINE HYDROCHLORIDE 4 ML: 10 INJECTION, SOLUTION INFILTRATION; PERINEURAL at 09:02

## 2022-02-26 RX ADMIN — TRIAMCINOLONE ACETONIDE 40 MG: 40 INJECTION, SUSPENSION INTRA-ARTICULAR; INTRAMUSCULAR at 09:02

## 2022-02-26 NOTE — PROGRESS NOTES
Assessment/Plan:    Diagnoses and all orders for this visit:    Injury of left knee, subsequent encounter  -     Large joint arthrocentesis: L knee    Acute pain of left knee  -     Large joint arthrocentesis: L knee    Primary osteoarthritis of left knee  -     Large joint arthrocentesis: L knee        Return in about 4 weeks (around 3/26/2022)  Chief Complaint:   No chief complaint on file  Subjective:   Patient ID: Lizeth Hassan is a 54 y o  female  Onset 1/13/22    Patient returns with improvement, however still with medial pain, tightness and sense of giving out  She notes she tweaked it again while caring for her dog    Previous note: NP presents for left knee injury and a 'crack' hurrying up the stairs c/o pain which is anterior medial and posterior she had trouble WB was evaluated at urgent care Xrays obtained and reviewed using crutches notes improving symptoms taking Mobic         Review of Systems    The following portions of the patient's chart were reviewed and updated as appropriate:    Allergy:  No Known Allergies      Past Medical History:   Diagnosis Date    Arthralgia     diffuse    BMI 40 0-44 9, adult ([facility] ) 10/29/2019    Disease of thyroid gland     Hypertension     Lupus ([facility] ) 10/19/2015    Mucositis        Past Surgical History:   Procedure Laterality Date    WISDOM TOOTH EXTRACTION         Social History     Socioeconomic History    Marital status: /Civil Union     Spouse name: Not on file    Number of children: 0    Years of education: Not on file    Highest education level: Not on file   Occupational History    Occupation: CNA   Tobacco Use    Smoking status: Never Smoker    Smokeless tobacco: Never Used   Vaping Use    Vaping Use: Never used   Substance and Sexual Activity    Alcohol use: Yes     Comment: social    Drug use: No    Sexual activity: Not on file   Other Topics Concern    Not on file   Social History Narrative    Does not exercise No known STD risk factors     Social Determinants of Health     Financial Resource Strain: Not on file   Food Insecurity: Not on file   Transportation Needs: Not on file   Physical Activity: Not on file   Stress: Not on file   Social Connections: Not on file   Intimate Partner Violence: Not on file   Housing Stability: Not on file       Family History   Problem Relation Age of Onset    Glaucoma Mother     Diabetes Mother     Hypertension Father        Medications:    Current Outpatient Medications:     amLODIPine (NORVASC) 5 mg tablet, Take 1 tablet (5 mg total) by mouth daily, Disp: 90 tablet, Rfl: 1    Ascorbic Acid (VITAMIN C ER) 1000 MG TBCR, Take 1,000 mg by mouth daily, Disp: , Rfl:     Cholecalciferol 2000 units CAPS, Take 2,000 Units by mouth 4 (four) times a day , Disp: , Rfl:     cyclobenzaprine (FLEXERIL) 10 mg tablet, Take 1 tablet by mouth daily as needed, Disp: , Rfl:     hydroxychloroquine (PLAQUENIL) 200 mg tablet, Take 2 tablets (400 mg total) by mouth daily, Disp: 180 tablet, Rfl: 1    levothyroxine 50 mcg tablet, Take 1 tablet (50 mcg total) by mouth daily, Disp: 90 tablet, Rfl: 1    meloxicam (Mobic) 15 mg tablet, Take 1 tablet (15 mg total) by mouth daily, Disp: 90 tablet, Rfl: 1    Multiple Vitamins-Minerals (MULTIVITAMIN ADULT PO), Take 1 capsule by mouth, Disp: , Rfl:     Omega-3 Fatty Acids (EQL OMEGA 3 FISH OIL) 1400 MG CAPS, Take 1 capsule by mouth daily, Disp: , Rfl:     Patient Active Problem List   Diagnosis    Benign essential hypertension    Essential hypertriglyceridemia    Fatigue    Vitamin D deficiency    Acquired hypothyroidism    [facility] connective tissue disease ([facility] )    BMI 40 0-44 9, adult ([facility] )    Family history of early CAD       Objective:  /89   Pulse 105   Ht 5' 3" (1 6 m)   Wt 101 kg (223 lb 6 4 oz)   BMI 39 57 kg/m²     Left Knee Exam     Other   Erythema: absent            Physical Exam      Neurologic Exam    Large joint arthrocentesis: L knee  Universal Protocol:  Consent: Verbal consent obtained  Risks and benefits: risks, benefits and alternatives were discussed  Consent given by: patient  Time out: Immediately prior to procedure a "time out" was called to verify the correct patient, procedure, equipment, support staff and site/side marked as required  Timeout called at: 2/26/2022 9:01 AM   Patient understanding: patient states understanding of the procedure being performed  Test results: test results available and properly labeled  Site marked: the operative site was marked  Patient identity confirmed: verbally with patient    Supporting Documentation  Indications: pain   Procedure Details  Location: knee - L knee  Preparation: Patient was prepped and draped in the usual sterile fashion  Needle size: 22 G  Ultrasound guidance: no  Approach: anterolateral  Medications administered: 4 mL lidocaine 1 %; 40 mg triamcinolone acetonide 40 mg/mL    Patient tolerance: patient tolerated the procedure well with no immediate complications  Dressing:  Sterile dressing applied          I have personally reviewed the written report of the pertinent studies

## 2022-03-18 ENCOUNTER — OFFICE VISIT (OUTPATIENT)
Dept: INTERNAL MEDICINE CLINIC | Age: 52
End: 2022-03-18
Payer: COMMERCIAL

## 2022-03-18 VITALS
HEIGHT: 63 IN | SYSTOLIC BLOOD PRESSURE: 130 MMHG | DIASTOLIC BLOOD PRESSURE: 88 MMHG | BODY MASS INDEX: 39.55 KG/M2 | WEIGHT: 223.2 LBS | HEART RATE: 88 BPM | TEMPERATURE: 97.7 F | OXYGEN SATURATION: 99 %

## 2022-03-18 DIAGNOSIS — E55.9 VITAMIN D DEFICIENCY: ICD-10-CM

## 2022-03-18 DIAGNOSIS — M35.1 MIXED CONNECTIVE TISSUE DISEASE (HCC): ICD-10-CM

## 2022-03-18 DIAGNOSIS — E78.1 ESSENTIAL HYPERTRIGLYCERIDEMIA: ICD-10-CM

## 2022-03-18 DIAGNOSIS — I10 BENIGN ESSENTIAL HYPERTENSION: ICD-10-CM

## 2022-03-18 DIAGNOSIS — E03.9 ACQUIRED HYPOTHYROIDISM: Primary | ICD-10-CM

## 2022-03-18 PROCEDURE — 3725F SCREEN DEPRESSION PERFORMED: CPT | Performed by: FAMILY MEDICINE

## 2022-03-18 PROCEDURE — 99214 OFFICE O/P EST MOD 30 MIN: CPT | Performed by: FAMILY MEDICINE

## 2022-03-18 RX ORDER — DULOXETIN HYDROCHLORIDE 30 MG/1
30 CAPSULE, DELAYED RELEASE ORAL DAILY
Qty: 30 CAPSULE | Refills: 5 | Status: SHIPPED | OUTPATIENT
Start: 2022-03-18

## 2022-03-18 RX ORDER — MELOXICAM 15 MG/1
15 TABLET ORAL DAILY
Qty: 90 TABLET | Refills: 1 | Status: SHIPPED | OUTPATIENT
Start: 2022-03-18

## 2022-03-18 RX ORDER — CYCLOBENZAPRINE HCL 10 MG
10 TABLET ORAL DAILY PRN
Qty: 90 TABLET | Refills: 1 | Status: SHIPPED | OUTPATIENT
Start: 2022-03-18

## 2022-03-18 NOTE — PROGRESS NOTES
Assessment/Plan:    1  Acquired hypothyroidism  -     TSH, 3rd generation with Free T4 reflex; Future    2  Benign essential hypertension  -     CBC and differential; Future  -     Comprehensive metabolic panel; Future    3  Essential hypertriglyceridemia  -     Lipid panel; Future    4  Vitamin D deficiency  -     Vitamin D 25 hydroxy; Future    5  Mixed connective tissue disease (HCC)  -     meloxicam (Mobic) 15 mg tablet; Take 1 tablet (15 mg total) by mouth daily  -     cyclobenzaprine (FLEXERIL) 10 mg tablet; Take 1 tablet (10 mg total) by mouth daily as needed for muscle spasms  -     DULoxetine (Cymbalta) 30 mg delayed release capsule; Take 1 capsule (30 mg total) by mouth daily    6  BMI 39 0-39 9,adult      BMI Counseling: Body mass index is 39 54 kg/m²  The BMI is above normal  Nutrition recommendations include moderation in carbohydrate intake  Exercise recommendations include exercising 3-5 times per week  No pharmacotherapy was ordered  Rationale for BMI follow-up plan is due to patient being overweight or obese  Depression Screening and Follow-up Plan: Patient was screened for depression during today's encounter  They screened negative with a PHQ-2 score of 0  Discussed nonmedical coping strategies and stress management  Offered advice on being a caretaker  Start Cymbalta to see if it helps with physical pain flare ups as well as current stressed  Call if any issues  Lab work reviewed  Discussed proper eating and not skipping meals      There are no Patient Instructions on file for this visit  Return in about 6 months (around 9/18/2022) for Recheck  Subjective:      Patient ID: Iman Campos is a 54 y o  female  Chief Complaint   Patient presents with    Follow-up     Follow up chronic condtiions  Labs done 3/14/22        Health Maintenance     physical, BMI follow up due 5/14/22        HPI    Hyperlipidemia : The patient is being seen for a routine clinic follow-up of hyperlipidemia  Recent measurements for this patient were taken changed fish oil brand  Recent measurements: total cholesterol 182 mg/dL and  mg/dL  Her total cholesterol is decreasing and LDL is increasing  Associated symptoms:  no chest pain,-no exertional calf cramps-and-no nausea-  Oct 2019 due for labs now, last labs are controlled   November 2020, no issues and controlled  The patient presents with complaints of myalgias (attributes to lupus and connective tissue disease)  May 2021:  Elevated on this visit  Juan Whitfield never had any problems in the past   Has not been changing her diet or eating poorly and is still very active   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 September 2021, significantly improved lab work  She has been doing of studying and research regarding nutrition because of her mother's cardiac condition they had been eating  She notices she feels better also  March 2022,  with slight increase in total cholesterol  Has been eating poorly because her mother has been very sick and requiring near total care and she has been helping her father at home        SLE (Brief): The patient is being seen for a routine clinic follow-up of systemic lupus erythematosus  Symptoms:  joint pain, but-no fever,-no malaise,-no weight loss-and-no chest pain-   The patient presents with complaints of visual problems (follows with eye doctor every 6 months  )  (Follows with rheumatology   Plaquenil daily, Mobic daily, and Flexeril as needed  )  April 2019, now has been diagnosed not with lupus with mixed connective tissue disease feels worse with barometric pressure elevated but is working fine and not missing work oct 2019 symptoms are better since her busy work season is [employer] 2020, no changes and is stable  May 2021, relatively stable   CRP is 8 8 and she has an appointment twice a year with her rheumatologist  September 2021 no changes  March 2022, no changes     Vitamin D Deficiency: The patient is being seen for follow-up of vitamin D deficiency  Current treatment includes vitamin D3 (cholecalciferol) and 6000 IU per day  Symptoms:  myalgias but-no fatigue,-no brittle nails-and-no depression   April 2019, taking increase higher doses over-the-counter of 8000 international units   Does not want weekly dose at this time  Kacey Arita is 34  Followed by Rheumatology oct 2019 now sees them yearly, due for labs November 2020, vitamin-D level in range now  [facility] 2021, vitamin-D levels in range now and patient states she feels better at this level   September 2020, taking 10,000 international daily during the week days and nothing on the weekends  Her level was 71 and maintained at 67  March 2022, due for labs at next visit     Hypertension (Follow-Up): The patient presents for follow-up of essential hypertension  The patient states she has been doing well with her blood pressure control since the last visit  Symptoms: denies impaired vision,-denies dyspnea,-denies chest pain,-denies intermittent leg claudication-and-improved lower extremity edema  Associated symptoms include no headache  Home monitoring: The patient is not checking blood pressure at home  Lifestyle: Diet: She consumes a diverse and healthy diet  Exercise: She exercises regularly  (walk a lot ) 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  Additional History: follows eye doctor every 6 months    Melody Doctor 2022, well controlled  Caregiver stress, patient's mother has been in the hospital for several months and now is home with her father  She is helping to take care of her and goes every days been several hours and does the cooking cleaning and running errands and taking her to the doctor's appointment    She is stressed out is not getting enough sleep and feels that she is ignoring her own house on her own  and is not eating well    The following portions of the patient's history were reviewed and updated as appropriate: allergies, current medications, past family history, past medical history, past social history, past surgical history and problem list     Review of Systems      Constitutional:  Denies fever or chills   Eyes:  Denies double , blurry vision or eye pain  HENT:  Denies nasal congestion or sore throat   Respiratory:  Denies cough or shortness of breath or wheezing  Cardiovascular:  Denies palpitations or chest pain  GI:  Denies abdominal pain, nausea, or vomiting, no loose stools, no reflux  Integument:  Denies rash , no open areas  Neurologic:  Denies headache or focal weakness, no dizziness  : no dysuria, or hematuria        Current Outpatient Medications   Medication Sig Dispense Refill    amLODIPine (NORVASC) 5 mg tablet Take 1 tablet (5 mg total) by mouth daily 90 tablet 1    Ascorbic Acid (VITAMIN C ER) 1000 MG TBCR Take 1,000 mg by mouth daily      Cholecalciferol 2000 units CAPS Take 2,000 Units by mouth 4 (four) times a day       cyclobenzaprine (FLEXERIL) 10 mg tablet Take 1 tablet (10 mg total) by mouth daily as needed for muscle spasms 90 tablet 1    hydroxychloroquine (PLAQUENIL) 200 mg tablet Take 2 tablets (400 mg total) by mouth daily 180 tablet 1    levothyroxine 50 mcg tablet Take 1 tablet (50 mcg total) by mouth daily 90 tablet 1    meloxicam (Mobic) 15 mg tablet Take 1 tablet (15 mg total) by mouth daily 90 tablet 1    Multiple Vitamins-Minerals (MULTIVITAMIN ADULT PO) Take 1 capsule by mouth      Omega-3 Fatty Acids (EQL OMEGA 3 FISH OIL) 1400 MG CAPS Take 1 capsule by mouth daily      DULoxetine (Cymbalta) 30 mg delayed release capsule Take 1 capsule (30 mg total) by mouth daily 30 capsule 5     No current facility-administered medications for this visit         Objective:    /88 (BP Location: Left arm, Patient Position: Sitting, Cuff Size: Large)   Pulse 88   Temp 97 7 °F (36 5 °C) (Temporal)   Ht 5' 3" (1 6 m)   Wt 101 kg (223 lb 3 2 oz)   SpO2 99% Comment: room air  BMI 39 54 kg/m²        Physical Exam       Constitutional:  Well developed, well nourished, no acute distress, non-toxic appearance   Eyes:  PERRL, conjunctiva normal , non icteric sclera  HENT:  Atraumatic, oropharynx moist  Neck-  supple   Respiratory:  CTA b/l, normal breath sounds, no rales, no wheezing   Cardiovascular:  RRR, no murmurs, no LE edema b/l  GI:  Soft, nondistended, normal bowel sounds x 4, nontender, no organomegaly, no mass, no rebound, no guarding   Neurologic:  no focal deficits noted   Psychiatric:  Speech and behavior appropriate , AAO x 3, tearful at times      Charbricee Kimo, DO

## 2022-03-19 ENCOUNTER — OFFICE VISIT (OUTPATIENT)
Dept: OBGYN CLINIC | Facility: MEDICAL CENTER | Age: 52
End: 2022-03-19
Payer: COMMERCIAL

## 2022-03-19 VITALS
HEIGHT: 63 IN | SYSTOLIC BLOOD PRESSURE: 148 MMHG | DIASTOLIC BLOOD PRESSURE: 90 MMHG | WEIGHT: 221 LBS | HEART RATE: 94 BPM | BODY MASS INDEX: 39.16 KG/M2

## 2022-03-19 DIAGNOSIS — M25.562 ACUTE PAIN OF LEFT KNEE: ICD-10-CM

## 2022-03-19 DIAGNOSIS — S89.92XD INJURY OF LEFT KNEE, SUBSEQUENT ENCOUNTER: Primary | ICD-10-CM

## 2022-03-19 DIAGNOSIS — M17.12 PRIMARY OSTEOARTHRITIS OF LEFT KNEE: ICD-10-CM

## 2022-03-19 PROCEDURE — 1036F TOBACCO NON-USER: CPT | Performed by: EMERGENCY MEDICINE

## 2022-03-19 PROCEDURE — 3008F BODY MASS INDEX DOCD: CPT | Performed by: EMERGENCY MEDICINE

## 2022-03-19 PROCEDURE — 99212 OFFICE O/P EST SF 10 MIN: CPT | Performed by: EMERGENCY MEDICINE

## 2022-03-19 NOTE — PROGRESS NOTES
Assessment/Plan:    Diagnoses and all orders for this visit:    Injury of left knee, subsequent encounter    Acute pain of left knee    Primary osteoarthritis of left knee    Patient improved after steroid injection, reviewed Xrays  She has completed PT and is taking Mobic  Continue HEP  If pain shoulder worsening t/c MRI Left knee evaluate for degenerative changes and MMT    Return if symptoms worsen or fail to improve  Chief Complaint:     Chief Complaint   Patient presents with    Left Knee - Pain, Follow-up       Subjective:   Patient ID: Danelle White is a 54 y o  female  Onset 1/13/22     Patient returns with 2 weeks of benefit from steroid injection however feels symptoms are beginning to return  She is performing HEP learned from PT which she completed a month of  Taking Mobic daily    Previous note:  Patient returns with improvement, however still with medial pain, tightness and sense of giving out  She notes she tweaked it again while caring for her dog     Previous note: NP presents for left knee injury and a 'crack' hurrying up the stairs c/o pain which is anterior medial and posterior she had trouble WB was evaluated at urgent care Xrays obtained and reviewed using crutches notes improving symptoms taking Mobic      Review of Systems    The following portions of the patient's chart were reviewed and updated as appropriate:    Allergy:  No Known Allergies      Past Medical History:   Diagnosis Date    Arthralgia     diffuse    [facility] 40 0-44 9, [facility] 10/29/2019    Disease of thyroid gland     Hypertension     Lupus [facility] ) 10/19/2015    Mucositis        Past Surgical History:   Procedure Laterality Date    WISDOM TOOTH EXTRACTION         Social History     Socioeconomic History    Marital status: /Civil Union     Spouse name: Not on file    Number of children: 0    Years of education: Not on file    Highest education level: Not on file   Occupational History    Occupation: CNA   Tobacco Use    Smoking status: Never Smoker    Smokeless tobacco: Never Used   Vaping Use    Vaping Use: Never used   Substance and Sexual Activity    Alcohol use: Not Currently     Alcohol/week: 0 0 standard drinks    Drug use: No    Sexual activity: Not on file   Other Topics Concern    Not on file   Social History Narrative    Does not exercise    No known STD risk factors     Social Determinants of Health     Financial Resource Strain: Not on file   Food Insecurity: Not on file   Transportation Needs: Not on file   Physical Activity: Not on file   Stress: Not on file   Social Connections: Not on file   Intimate Partner Violence: Not on file   Housing Stability: Not on file       Family History   Problem Relation Age of Onset    Glaucoma Mother     Diabetes Mother     Hypertension Father        Medications:    Current Outpatient Medications:     amLODIPine (NORVASC) 5 mg tablet, Take 1 tablet (5 mg total) by mouth daily, Disp: 90 tablet, Rfl: 1    Ascorbic Acid (VITAMIN C ER) 1000 MG TBCR, Take 1,000 mg by mouth daily, Disp: , Rfl:     Cholecalciferol 2000 units CAPS, Take 2,000 Units by mouth 4 (four) times a day , Disp: , Rfl:     cyclobenzaprine (FLEXERIL) 10 mg tablet, Take 1 tablet (10 mg total) by mouth daily as needed for muscle spasms, Disp: 90 tablet, Rfl: 1    DULoxetine (Cymbalta) 30 mg delayed release capsule, Take 1 capsule (30 mg total) by mouth daily, Disp: 30 capsule, Rfl: 5    hydroxychloroquine (PLAQUENIL) 200 mg tablet, Take 2 tablets (400 mg total) by mouth daily, Disp: 180 tablet, Rfl: 1    levothyroxine 50 mcg tablet, Take 1 tablet (50 mcg total) by mouth daily, Disp: 90 tablet, Rfl: 1    meloxicam (Mobic) 15 mg tablet, Take 1 tablet (15 mg total) by mouth daily, Disp: 90 tablet, Rfl: 1    Multiple Vitamins-Minerals (MULTIVITAMIN ADULT PO), Take 1 capsule by mouth, Disp: , Rfl:     Omega-3 Fatty Acids (EQL OMEGA 3 FISH OIL) 1400 MG CAPS, Take 1 capsule by mouth daily, Disp: , Rfl:     Patient Active Problem List   Diagnosis    Benign essential hypertension    Essential hypertriglyceridemia    Fatigue    Vitamin D deficiency    Acquired hypothyroidism    Mixed connective tissue disease ([facility] )    Family history of early CAD    BMI 39 0-39 9,adult       Objective:  /90   Pulse 94   Ht 5' 3" (1 6 m)   Wt 100 kg (221 lb)   BMI 39 15 kg/m²     Ortho Exam    Physical Exam      Neurologic Exam    Procedures    I have personally reviewed the written report of the pertinent studies

## 2022-08-31 DIAGNOSIS — E03.9 HYPOTHYROIDISM, UNSPECIFIED TYPE: ICD-10-CM

## 2022-08-31 RX ORDER — LEVOTHYROXINE SODIUM 0.05 MG/1
50 TABLET ORAL DAILY
Qty: 90 TABLET | Refills: 1 | Status: SHIPPED | OUTPATIENT
Start: 2022-08-31

## 2022-09-29 ENCOUNTER — RA CDI HCC (OUTPATIENT)
Dept: OTHER | Facility: HOSPITAL | Age: 52
End: 2022-09-29

## 2022-09-29 NOTE — PROGRESS NOTES
Please review if this dx  is applicable to the patient's condition and assess and document, if applicable in next visit     E66 01: Morbid (severe) obesity due to excess calories ([address] )     Per CMS/ICD 10 coding guidelines, to be used when BMI > 35 & <40 with one or more comorbidity (DM, HTN, or BON)    [address]  coding opportunities          Chart Reviewed number of suggestions sent to Provider: 1     Patients Insurance        Commercial Insurance: [address] [place] [address]

## 2022-09-30 ENCOUNTER — OFFICE VISIT (OUTPATIENT)
Dept: INTERNAL MEDICINE CLINIC | Age: 52
End: 2022-09-30
Payer: COMMERCIAL

## 2022-09-30 ENCOUNTER — TELEPHONE (OUTPATIENT)
Dept: ADMINISTRATIVE | Facility: OTHER | Age: 52
End: 2022-09-30

## 2022-09-30 VITALS
OXYGEN SATURATION: 99 % | SYSTOLIC BLOOD PRESSURE: 136 MMHG | BODY MASS INDEX: 40.86 KG/M2 | TEMPERATURE: 97.6 F | HEART RATE: 84 BPM | WEIGHT: 230.6 LBS | DIASTOLIC BLOOD PRESSURE: 82 MMHG | HEIGHT: 63 IN

## 2022-09-30 DIAGNOSIS — M25.50 ARTHRALGIA OF MULTIPLE JOINTS: ICD-10-CM

## 2022-09-30 DIAGNOSIS — E03.9 ACQUIRED HYPOTHYROIDISM: ICD-10-CM

## 2022-09-30 DIAGNOSIS — Z23 NEED FOR INFLUENZA VACCINATION: ICD-10-CM

## 2022-09-30 DIAGNOSIS — E55.9 VITAMIN D DEFICIENCY: ICD-10-CM

## 2022-09-30 DIAGNOSIS — Z23 NEED FOR TDAP VACCINATION: ICD-10-CM

## 2022-09-30 DIAGNOSIS — I10 BENIGN ESSENTIAL HYPERTENSION: ICD-10-CM

## 2022-09-30 DIAGNOSIS — Z12.11 ENCOUNTER FOR SCREENING FOR MALIGNANT NEOPLASM OF COLON: Primary | ICD-10-CM

## 2022-09-30 DIAGNOSIS — E78.1 ESSENTIAL HYPERTRIGLYCERIDEMIA: ICD-10-CM

## 2022-09-30 PROCEDURE — 90682 RIV4 VACC RECOMBINANT DNA IM: CPT

## 2022-09-30 PROCEDURE — 90471 IMMUNIZATION ADMIN: CPT

## 2022-09-30 PROCEDURE — 90472 IMMUNIZATION ADMIN EACH ADD: CPT

## 2022-09-30 PROCEDURE — 90715 TDAP VACCINE 7 YRS/> IM: CPT

## 2022-09-30 PROCEDURE — 99214 OFFICE O/P EST MOD 30 MIN: CPT | Performed by: FAMILY MEDICINE

## 2022-09-30 NOTE — PROGRESS NOTES
Assessment/Plan:    1  Encounter for screening for malignant neoplasm of colon  -     Cologuard    2  Vitamin D deficiency    3  Benign essential hypertension    4  Acquired hypothyroidism  -     TSH, 3rd generation; Future  -     T4, free; Future    5  Essential hypertriglyceridemia    6  Arthralgia of multiple joints      BMI Counseling: Body mass index is 40 85 kg/m²  The BMI is above normal  Nutrition recommendations include moderation in carbohydrate intake  Exercise recommendations include exercising 3-5 times per week  No pharmacotherapy was ordered  Rationale for BMI follow-up plan is due to patient being overweight or obese  There are no Patient Instructions on file for this visit  Return in about 6 months (around 3/30/2023) for Recheck  Subjective:      Patient ID: Quan Henderson is a 50 y o  female  Chief Complaint   Patient presents with    Follow-up     HTN  Review labs     Health maint     Pt  Refuses colonoscopy, pt  Would like to discuss cologuard instead  HPI  Hyperlipidemia : The patient is being seen for a routine clinic follow-up of hyperlipidemia  Recent measurements for this patient were taken changed fish oil brand  Recent measurements: total cholesterol 182 mg/dL and  mg/dL  Her total cholesterol is decreasing and LDL is increasing  Associated symptoms:  no chest pain,-no exertional calf cramps-and-no nausea-  Oct 2019 due for labs now, last labs are controlled   November 2020, no issues and controlled  The patient presents with complaints of myalgias (attributes to lupus and connective tissue disease)     May 2021:  Elevated on this visit  Kena Judd never had any problems in the past   Has not been changing her diet or eating poorly and is still very active   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 September 2021, significantly improved lab work  Rogerio Atkinson has been doing of studying and research regarding nutrition because of her mother's cardiac condition they had been eating  Lisa Lazcano notices she feels better also  March 2022,  with slight increase in total cholesterol  Has been eating poorly because her mother has been very sick and requiring near total care and she has been helping her father at home    Sept 2022: lipids are improved despite weight gain      SLE (Brief): The patient is being seen for a routine clinic follow-up of systemic lupus erythematosus  Symptoms:  joint pain, but-no fever,-no malaise,-no weight loss-and-no chest pain-   The patient presents with complaints of visual problems (follows with eye doctor every 6 months  )  (Follows with rheumatology  Plaquenil daily, Mobic daily, and Flexeril as needed  )  April 2019, now has been diagnosed not with lupus with mixed connective tissue disease feels worse with barometric pressure elevated but is working fine and not missing work oct 2019 symptoms are better since her busy work season is [employer] 2020, no changes and is stable  May 2021, relatively stable   CRP is 8 8 and she has an appointment twice a year with her rheumatologist  September 2021 no changes  March 2022, no changes  Sept 2022: no issues but has a lt of stress and is having arthalgias      Vitamin D Deficiency: The patient is being seen for follow-up of vitamin D deficiency  Current treatment includes vitamin D3 (cholecalciferol) and 6000 IU per day  Symptoms:  myalgias but-no fatigue,-no brittle nails-and-no depression   April 2019, taking increase higher doses over-the-counter of 8000 international units   Does not want weekly dose at this time  Stephen Form is 34   Followed by Rheumatology oct 2019 now sees them yearly, due for labs November 2020, vitamin-D level in range now  [facility] 2021, vitamin-D levels in range now and patient states she feels better at this level   September 2020, taking 10,000 international daily during the week days and nothing on the weekends  Staci Edge level was 71 and maintained at 79  March 2022, due for labs at next visit  Sept 2022: level is good at 68, 10K m-f       Hypertension (Follow-Up): The patient presents for follow-up of essential hypertension  The patient states she has been doing well with her blood pressure control since the last visit  Symptoms: denies impaired vision,-denies dyspnea,-denies chest pain,-denies intermittent leg claudication-and-improved lower extremity edema  Associated symptoms include no headache  Home monitoring: The patient is not checking blood pressure at home  Lifestyle: Diet: She consumes a diverse and healthy diet  Exercise: She exercises regularly  (walk a lot ) 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  Additional History: follows eye doctor every 6 months    Yusra Kendrick 2022, well controlled  Sept 2022: well controlled despite weight gain, no issues with meds       Caregiver stress, patient's mother has been in the hospital for several months and now is home with her father  She is helping to take care of her and goes every days been several hours and does the cooking cleaning and running errands and taking her to the doctor's appointment    She is stressed out is not getting enough sleep and feels that she is ignoring her own house on her own  and is not eating well  Sept 2022: no changes, still taking care of mom and helping with the family business       The following portions of the patient's history were reviewed and updated as appropriate: allergies, current medications, past family history, past medical history, past social history, past surgical history and problem list     Review of Systems      Constitutional:  Denies fever or chills , + weight gain  Eyes:  Denies double , blurry vision or eye pain  HENT:  Denies nasal congestion or sore throat   Respiratory:  Denies cough or shortness of breath or wheezing  Cardiovascular:  Denies palpitations or chest pain  GI:  Denies abdominal pain, nausea, or vomiting, no loose stools, no reflux  Integument:  Denies rash , no open areas  Neurologic:  Denies headache or focal weakness, no dizziness  : no dysuria, or hematuria        Current Outpatient Medications   Medication Sig Dispense Refill    amLODIPine (NORVASC) 5 mg tablet Take 1 tablet (5 mg total) by mouth daily 90 tablet 1    Ascorbic Acid (VITAMIN C ER) 1000 MG TBCR Take 1,000 mg by mouth daily      Cholecalciferol 2000 units CAPS Take 5,000 Units by mouth 2 (two) times a day 10,000 units Monday-Friday      cyclobenzaprine (FLEXERIL) 10 mg tablet Take 1 tablet (10 mg total) by mouth daily as needed for muscle spasms 90 tablet 1    hydroxychloroquine (PLAQUENIL) 200 mg tablet Take 2 tablets (400 mg total) by mouth daily 180 tablet 1    levothyroxine 50 mcg tablet Take 1 tablet (50 mcg total) by mouth daily 90 tablet 1    meloxicam (Mobic) 15 mg tablet Take 1 tablet (15 mg total) by mouth daily 90 tablet 1    Multiple Vitamins-Minerals (MULTIVITAMIN ADULT PO) Take 1 capsule by mouth daily      Omega-3 Fatty Acids (EQL OMEGA 3 FISH OIL) 1400 MG CAPS Take 1 capsule by mouth daily      DULoxetine (Cymbalta) 30 mg delayed release capsule Take 1 capsule (30 mg total) by mouth daily (Patient not taking: Reported on 9/30/2022) 30 capsule 5     No current facility-administered medications for this visit         Objective:    /82 (BP Location: Left arm, Patient Position: Sitting, Cuff Size: Large)   Pulse 84   Temp 97 6 °F (36 4 °C) (Temporal)   Ht 5' 3" (1 6 m)   Wt 105 kg (230 lb 9 6 oz)   SpO2 99%   BMI 40 85 kg/m²        Physical Exam       Constitutional:  Well developed, well nourished, no acute distress, non-toxic appearance   Eyes:  PERRL, conjunctiva normal , non icteric sclera  HENT:  Atraumatic, oropharynx moist  Neck-  supple   Respiratory:  CTA b/l, normal breath sounds, no rales, no wheezing   Cardiovascular:  RRR, no murmurs, no LE edema b/l  GI:  Soft, nondistended, normal bowel sounds x 4, nontender, no organomegaly, no mass, no rebound, no guarding   Neurologic:  no focal deficits noted   Psychiatric:  Speech and behavior appropriate , AAO x 3        Brenda Nasuti

## 2022-09-30 NOTE — TELEPHONE ENCOUNTER
----- Message from Lamar Alfaro MA sent at 9/30/2022  9:51 AM EDT -----  Regarding: Mammogram  09/30/22 9:51 AM    Hello, our patient Vivke Beltran has had Mammogram completed/performed  Please assist in updating the patient chart by pulling the Care Everywhere (CE) document  The date of service is 08/05/2022       Thank you,  Terry Kay  [address] [facility]

## 2022-10-03 NOTE — TELEPHONE ENCOUNTER
Upon review of the In Basket request we were able to locate, review, and update the patient chart as requested for Mammogram     Any additional questions or concerns should be emailed to the Practice Liaisons via [email or web address] email, please do not reply via In Basket      Thank you  Shari Knott

## 2022-10-20 ENCOUNTER — VBI (OUTPATIENT)
Dept: ADMINISTRATIVE | Facility: OTHER | Age: 52
End: 2022-10-20

## 2023-03-08 DIAGNOSIS — E03.9 HYPOTHYROIDISM, UNSPECIFIED TYPE: ICD-10-CM

## 2023-03-08 RX ORDER — LEVOTHYROXINE SODIUM 0.05 MG/1
50 TABLET ORAL DAILY
Qty: 90 TABLET | Refills: 1 | Status: SHIPPED | OUTPATIENT
Start: 2023-03-08

## 2023-03-27 ENCOUNTER — RA CDI HCC (OUTPATIENT)
Dept: OTHER | Facility: HOSPITAL | Age: 53
End: 2023-03-27

## 2023-03-27 NOTE — PROGRESS NOTES
NOT on the BPA- please assess using MEAT for 2023 billing    [facility]  coding opportunities          Chart Reviewed number of suggestions sent to Provider: 1     Patients Insurance        Commercial Insurance: [address] [place] [address]

## 2023-03-31 ENCOUNTER — OFFICE VISIT (OUTPATIENT)
Dept: INTERNAL MEDICINE CLINIC | Age: 53
End: 2023-03-31

## 2023-03-31 VITALS
BODY MASS INDEX: 41.89 KG/M2 | DIASTOLIC BLOOD PRESSURE: 82 MMHG | OXYGEN SATURATION: 97 % | HEART RATE: 82 BPM | SYSTOLIC BLOOD PRESSURE: 132 MMHG | WEIGHT: 236.4 LBS | HEIGHT: 63 IN | TEMPERATURE: 97.8 F

## 2023-03-31 DIAGNOSIS — E03.9 ACQUIRED HYPOTHYROIDISM: Primary | ICD-10-CM

## 2023-03-31 DIAGNOSIS — E55.9 VITAMIN D DEFICIENCY: ICD-10-CM

## 2023-03-31 DIAGNOSIS — R31.29 MICROSCOPIC HEMATURIA: ICD-10-CM

## 2023-03-31 DIAGNOSIS — E78.1 ESSENTIAL HYPERTRIGLYCERIDEMIA: ICD-10-CM

## 2023-03-31 DIAGNOSIS — I10 BENIGN ESSENTIAL HYPERTENSION: ICD-10-CM

## 2023-03-31 DIAGNOSIS — M35.1 MIXED CONNECTIVE TISSUE DISEASE (HCC): ICD-10-CM

## 2023-03-31 LAB
SL AMB  POCT GLUCOSE, UA: NORMAL
SL AMB LEUKOCYTE ESTERASE,UA: NORMAL
SL AMB POCT BILIRUBIN,UA: NORMAL
SL AMB POCT BLOOD,UA: NORMAL
SL AMB POCT CLARITY,UA: CLEAR
SL AMB POCT COLOR,UA: YELLOW
SL AMB POCT KETONES,UA: NORMAL
SL AMB POCT NITRITE,UA: NORMAL
SL AMB POCT PH,UA: 7.5
SL AMB POCT SPECIFIC GRAVITY,UA: 1.01
SL AMB POCT URINE PROTEIN: NORMAL
SL AMB POCT UROBILINOGEN: 0.2

## 2023-03-31 NOTE — PROGRESS NOTES
Assessment/Plan:    1  Acquired hypothyroidism  -     TSH, 3rd generation with Free T4 reflex; Future    2  Benign essential hypertension  -     CBC and differential; Future    3  Essential hypertriglyceridemia  -     Lipid Panel with Direct LDL reflex; Future  -     Comprehensive metabolic panel; Future    4  Vitamin D deficiency  -     Vitamin D 25 hydroxy; Future    5  Mixed connective tissue disease ([address] )    6  Microscopic hematuria  -     POCT urine dip      BMI Counseling: Body mass index is 41 88 kg/m²  The BMI is above normal  Nutrition recommendations include moderation in carbohydrate intake  Exercise recommendations include exercising 3-5 times per week  No pharmacotherapy was ordered  Rationale for BMI follow-up plan is due to patient being overweight or obese  Depression Screening and Follow-up Plan: Patient was screened for depression during today's encounter  They screened negative with a PHQ-2 score of 0  There are no Patient Instructions on file for this visit  Return in about 6 months (around 9/30/2023) for Recheck  Subjective:      Patient ID: Mary Ellen Stovall is a 50 y o  female  Chief Complaint   Patient presents with   • Follow-up     Acquired hypothyroidism  Review labs   • Health maint     Pt  Refuses cologuard kit, all 3 CRC screening options        HPI    Hyperlipidemia : The patient is being seen for a routine clinic follow-up of hyperlipidemia  Recent measurements for this patient were taken changed fish oil brand  Recent measurements: total cholesterol 182 mg/dL and  mg/dL  Her total cholesterol is decreasing and LDL is increasing  Associated symptoms:  no chest pain,-no exertional calf cramps-and-no nausea-  Oct 2019 due for labs now, last labs are controlled   November 2020, no issues and controlled  The patient presents with complaints of myalgias (attributes to lupus and connective tissue disease)     May 2021:  Elevated on this visit  Lisa Carias never had any problems in the past   Has not been changing her diet or eating poorly and is still very active   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 September 2021, significantly improved lab work  Jaret Vazquez has been doing of studying and research regarding nutrition because of her mother's cardiac condition they had been eating  Jaret Vazquez notices she feels better also  March 2022,  with slight increase in total cholesterol   Has been eating poorly because her mother has been very sick and requiring near total care and she has been helping her father at home    Sept 2022: lipids are improved despite weight gain   March 2023 significant improvement in lipid levels despite weight gain  He has gained 6 pounds since last visit        SLE (Brief): The patient is being seen for a routine clinic follow-up of systemic lupus erythematosus  Symptoms:  joint pain, but-no fever,-no malaise,-no weight loss-and-no chest pain-   The patient presents with complaints of visual problems (follows with eye doctor every 6 months  )  (Follows with rheumatology  Plaquenil daily, Mobic daily, and Flexeril as needed  )  April 2019, now has been diagnosed not with lupus with mixed connective tissue disease feels worse with barometric pressure elevated but is working fine and not missing work oct 2019 symptoms are better since her busy work season is [place] 2020, no changes and is stable  May 2021, relatively stable   CRP is 8 8 and she has an appointment twice a year with her rheumatologist  September 2021 no changes  March 2022, no changes  Sept 2022: no issues but has a lt of stress and is having arthalgias   March 2023, stable follows with rheumatology     Vitamin D Deficiency: The patient is being seen for follow-up of vitamin D deficiency  Current treatment includes vitamin D3 (cholecalciferol) and 6000 IU per day   Symptoms:  myalgias but-no fatigue,-no brittle nails-and-no depression   April 2019, taking increase higher doses over-the-counter of 8000 international units   Does not want weekly dose at this time  Asher Cortes is 34  Followed by Rheumatology oct 2019 now sees them yearly, due for labs November 2020, vitamin-D level in range now  [facility] 2021, vitamin-D levels in range now and patient states she feels better at this level   September 2020, taking 10,000 international daily during the week days and nothing on the weekends  Gio Pock level was 70 and maintained at 67  March 2022, due for labs at next visit  Sept 2022: level is good at 68, 10K m-f  March 2023, no issues, taking over-the-counter vitamin D     Hypertension (Follow-Up): The patient presents for follow-up of essential hypertension  The patient states she has been doing well with her blood pressure control since the last visit  Symptoms: denies impaired vision,-denies dyspnea,-denies chest pain,-denies intermittent leg claudication-and-improved lower extremity edema  Associated symptoms include no headache  Home monitoring: The patient is not checking blood pressure at home  Lifestyle: Diet: She consumes a diverse and healthy diet  Exercise: She exercises regularly  (walk a lot ) 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  Additional History: follows eye doctor every 6 months    Lucas Gutierrez 2022, well controlled    Sept 2022: well controlled despite weight gain, no issues with meds   March 2023, well controlled    Caregiver stress, patient's mother has been in the hospital for several months and now is home with her father  [place] is helping to take care of her and goes every days been several hours and does the cooking cleaning and running errands and taking her to the doctor's appointment  [place] is stressed out is not getting enough sleep and feels that she is ignoring her own house on her own  and is not eating well  Sept 2022: no changes, still taking care of mom and helping with the family business   March 2023, stable, her mother has not been in and out of the hospital and has been improving  She feels that she is back up to 85%    She is still the primary caretaker and 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         The following portions of the patient's history were reviewed and updated as appropriate: allergies, current medications, past family history, past medical history, past social history, past surgical history and problem list     Review of Systems      Constitutional:  Denies fever or chills , + weight gain   Eyes:  Denies double , blurry vision or eye pain  HENT:  Denies nasal congestion, sore throat or new hearing issues  Respiratory:  Denies cough or shortness of breath or wheezing  Cardiovascular:  Denies palpitations or chest pain  GI:  Denies abdominal pain, nausea, or vomiting, no loose stools, no reflux  Integument:  Denies rash , no open areas  Neurologic:  Denies headache or focal weakness, no dizziness  : no dysuria, or hematuria        Current Outpatient Medications   Medication Sig Dispense Refill   • amLODIPine (NORVASC) 5 mg tablet Take 1 tablet (5 mg total) by mouth daily 90 tablet 1   • Ascorbic Acid (VITAMIN C ER) 1000 MG TBCR Take 1,000 mg by mouth daily     • Cholecalciferol 2000 units CAPS Take 5,000 Units by mouth 2 (two) times a day 10,000 units Monday-Friday     • cyclobenzaprine (FLEXERIL) 10 mg tablet Take 1 tablet (10 mg total) by mouth daily as needed for muscle spasms 90 tablet 1   • hydroxychloroquine (PLAQUENIL) 200 mg tablet Take 2 tablets (400 mg total) by mouth daily 180 tablet 1   • levothyroxine 50 mcg tablet Take 1 tablet (50 mcg total) by mouth daily 90 tablet 1   • meloxicam (Mobic) 15 mg tablet Take 1 tablet (15 mg total) by mouth daily 90 tablet 1   • Multiple Vitamins-Minerals (MULTIVITAMIN ADULT PO) Take 1 capsule by mouth daily     • Omega-3 Fatty Acids (EQL OMEGA 3 FISH OIL) 1400 MG CAPS Take 1 capsule by mouth daily       No current facility-administered medications for this "visit         Objective:    /82 (BP Location: Left arm, Patient Position: Sitting, Cuff Size: Large)   Pulse 82   Temp 97 8 °F (36 6 °C) (Temporal)   Ht 5' 3\" (1 6 m)   Wt 107 kg (236 lb 6 4 oz)   SpO2 97%   BMI 41 88 kg/m²        Physical Exam       Constitutional:  Well developed, well nourished, no acute distress, non-toxic appearance   Eyes:  PERRL, conjunctiva normal , non icteric sclera  HENT:  Atraumatic, oropharynx moist  Neck-  supple   Respiratory:  CTA b/l, normal breath sounds, no rales, no wheezing   Cardiovascular:  RRR, no murmurs, no LE edema b/l  GI:  Soft, nondistended, normal bowel sounds x 4, nontender, no organomegaly, no mass, no rebound, no guarding   Neurologic:  no focal deficits noted   Psychiatric:  Speech and behavior appropriate , AAO x 3        Trudell Sidle, DO  "

## 2023-09-05 DIAGNOSIS — E03.9 HYPOTHYROIDISM, UNSPECIFIED TYPE: ICD-10-CM

## 2023-09-05 RX ORDER — LEVOTHYROXINE SODIUM 0.05 MG/1
50 TABLET ORAL DAILY
Qty: 90 TABLET | Refills: 3 | OUTPATIENT
Start: 2023-09-05

## 2023-09-05 RX ORDER — LEVOTHYROXINE SODIUM 0.05 MG/1
50 TABLET ORAL DAILY
Qty: 90 TABLET | Refills: 1 | Status: SHIPPED | OUTPATIENT
Start: 2023-09-05

## 2023-10-05 ENCOUNTER — RA CDI HCC (OUTPATIENT)
Dept: OTHER | Facility: HOSPITAL | Age: 53
End: 2023-10-05

## 2023-10-05 NOTE — PROGRESS NOTES
[address] [facility] coding opportunities          Chart Reviewed number of suggestions sent to Provider: 1     Patients Insurance        Commercial Insurance: [employer] Genao

## 2023-10-09 ENCOUNTER — TELEPHONE (OUTPATIENT)
Dept: ADMINISTRATIVE | Facility: OTHER | Age: 53
End: 2023-10-09

## 2023-10-09 ENCOUNTER — OFFICE VISIT (OUTPATIENT)
Dept: INTERNAL MEDICINE CLINIC | Facility: OTHER | Age: 53
End: 2023-10-09
Payer: COMMERCIAL

## 2023-10-09 VITALS
DIASTOLIC BLOOD PRESSURE: 82 MMHG | TEMPERATURE: 98 F | SYSTOLIC BLOOD PRESSURE: 132 MMHG | HEART RATE: 82 BPM | OXYGEN SATURATION: 99 % | BODY MASS INDEX: 40.57 KG/M2 | HEIGHT: 63 IN | WEIGHT: 229 LBS

## 2023-10-09 DIAGNOSIS — E03.9 HYPOTHYROIDISM, UNSPECIFIED TYPE: ICD-10-CM

## 2023-10-09 DIAGNOSIS — M35.1 MIXED CONNECTIVE TISSUE DISEASE (HCC): ICD-10-CM

## 2023-10-09 DIAGNOSIS — I10 BENIGN ESSENTIAL HYPERTENSION: ICD-10-CM

## 2023-10-09 DIAGNOSIS — M25.50 ARTHRALGIA OF MULTIPLE JOINTS: ICD-10-CM

## 2023-10-09 DIAGNOSIS — E78.1 ESSENTIAL HYPERTRIGLYCERIDEMIA: ICD-10-CM

## 2023-10-09 DIAGNOSIS — E03.9 ACQUIRED HYPOTHYROIDISM: Primary | ICD-10-CM

## 2023-10-09 DIAGNOSIS — Z23 NEEDS FLU SHOT: ICD-10-CM

## 2023-10-09 DIAGNOSIS — E55.9 VITAMIN D DEFICIENCY: ICD-10-CM

## 2023-10-09 PROCEDURE — 90471 IMMUNIZATION ADMIN: CPT

## 2023-10-09 PROCEDURE — 90686 IIV4 VACC NO PRSV 0.5 ML IM: CPT

## 2023-10-09 PROCEDURE — 99214 OFFICE O/P EST MOD 30 MIN: CPT | Performed by: FAMILY MEDICINE

## 2023-10-09 NOTE — PROGRESS NOTES
Assessment/Plan:    1. Acquired hypothyroidism  -     TSH, 3rd generation with Free T4 reflex; Future  2. Arthralgia of multiple joints  3. Hypothyroidism, unspecified type  4. Vitamin D deficiency  5. Benign essential hypertension  -     Comprehensive metabolic panel; Future  6. Essential hypertriglyceridemia  7. Mixed connective tissue disease (HCC)  8. Needs flu shot  -     influenza vaccine, quadrivalent, 0.5 mL, preservative-free, for adult and pediatric patients 6 mos+ (AFLURIA, FLUARIX, FLULAVAL, FLUZONE)          There are no Patient Instructions on file for this visit.    No follow-ups on file.    Subjective:      Patient ID: Sheree Cates is a 53 y.o. female.    Chief Complaint   Patient presents with    Follow-up     Patient here 6 month     Results     Labs done 10/3/2023    HM     Patient due for:  Mammogram - had one on 8/11/2023  Colorectal Cancer Screening - refused  Cerv cancer -- has Gyn and will make own appointment  Physical   PHQ    Immunizations     Fluzone Quad       HPI    Hyperlipidemia : The patient is being seen for a routine clinic follow-up of hyperlipidemia. Recent measurements for this patient were taken changed fish oil brand. Recent measurements: total cholesterol 182 mg/dL and  mg/dL. Her total cholesterol is decreasing and LDL is increasing. Associated symptoms:  no chest pain,-no exertional calf cramps-and-no nausea-  Oct 2019 due for labs now, last labs are controlled.  November 2020, no issues and controlled  The patient presents with complaints of myalgias (attributes to lupus and connective tissue disease).   May 2021:  Elevated on this visit.  Has never had any problems in the past.  Has not been changing her diet or eating poorly and is still very active.  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  She has been doing of studying and research regarding nutrition because of her mother's  cardiac condition they had been eating.  She notices she feels better also  March 2022,  with slight increase in total cholesterol.  Has been eating poorly because her mother has been very sick and requiring near total care and she has been helping her father at home.   Sept 2022: lipids are improved despite weight gain   March 2023 significant improvement in lipid levels despite weight gain.  He has gained 6 pounds since last visit.        SLE (Brief): The patient is being seen for a routine clinic follow-up of systemic lupus erythematosus. Symptoms:  joint pain, but-no fever,-no malaise,-no weight loss-and-no chest pain-   The patient presents with complaints of visual problems (follows with eye doctor every 6 months. ). (Follows with rheumatology. Plaquenil daily, Mobic daily, and Flexeril as needed. )  April 2019, now has been diagnosed not with lupus with mixed connective tissue disease feels worse with barometric pressure elevated but is working fine and not missing work oct 2019 symptoms are better since her busy work season is betetr ow November 2020, no changes and is stable  May 2021, relatively stable.  CRP is 8.8 and she has an appointment twice a year with her rheumatologist  September 2021 no changes  March 2022, no changes  Sept 2022: no issues but has a lt of stress and is having arthalgias   March 2023, stable follows with rheumatology     Vitamin D Deficiency: The patient is being seen for follow-up of vitamin D deficiency. Current treatment includes vitamin D3 (cholecalciferol) and 6000 IU per day. Symptoms:  myalgias but-no fatigue,-no brittle nails-and-no depression.  April 2019, taking increase higher doses over-the-counter of 8000 international units.  Does not want weekly dose at this time.  Level is 32. Followed by Rheumatology oct 2019 now sees them yearly, due for labs November 2020, vitamin-D level in range now   May 2021, vitamin-D levels in range now and patient states she feels  better at this level   September 2020, taking 10,000 international daily during the week days and nothing on the weekends.  Her level was 71 and maintained at 67  March 2022, due for labs at next visit  Sept 2022: level is good at 77, 10K m-f  March 2023, no issues, taking over-the-counter vitamin D     Hypertension (Follow-Up): The patient presents for follow-up of essential hypertension. The patient states she has been doing well with her blood pressure control since the last visit.   Symptoms: denies impaired vision,-denies dyspnea,-denies chest pain,-denies intermittent leg claudication-and-improved lower extremity edema. Associated symptoms include no headache.   Home monitoring: The patient is not checking blood pressure at home.   Lifestyle: Diet: She consumes a diverse and healthy diet.Exercise: She exercises regularly. 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    March 2022, well controlled.  Sept 2022: well controlled despite weight gain, no issues with meds   March 2023, well controlled     Caregiver stress, patient's mother has been in the hospital for several months and now is home with her father.  She is helping to take care of her and goes every days been several hours and does the cooking cleaning and running errands and taking her to the doctor's appointment.  She is stressed out is not getting enough sleep and feels that she is ignoring her own house on her own  and is not eating well  Sept 2022: no changes, still taking care of mom and helping with the family business   March 2023, stable, her mother has not been in and out of the hospital and has been improving.  She feels that she is back up to 85%.  She is still the primary caretaker and 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               The following portions of the patient's history were reviewed and updated as appropriate: allergies, current  "medications, past family history, past medical history, past social history, past surgical history and problem list.    Review of Systems      Constitutional:  Denies fever or chills   Eyes:  Denies double , blurry vision or eye pain  HENT:  Denies nasal congestion, sore throat or new hearing issues  Respiratory:  Denies cough or shortness of breath or wheezing  Cardiovascular:  Denies palpitations or chest pain  GI:  Denies abdominal pain, nausea, or vomiting, no loose stools, no reflux  Integument:  Denies rash , no open areas  Neurologic:  Denies headache or focal weakness, no dizziness  : no dysuria, or hematuria      Current Outpatient Medications   Medication Sig Dispense Refill    Ascorbic Acid (VITAMIN C ER) 1000 MG TBCR Take 1,000 mg by mouth daily      Cholecalciferol 2000 units CAPS Take 10,000 Units by mouth in the morning 10,000 units Monday-Friday      cyclobenzaprine (FLEXERIL) 10 mg tablet Take 1 tablet (10 mg total) by mouth daily as needed for muscle spasms 90 tablet 1    hydroxychloroquine (PLAQUENIL) 200 mg tablet Take 2 tablets (400 mg total) by mouth daily 180 tablet 1    meloxicam (Mobic) 15 mg tablet Take 1 tablet (15 mg total) by mouth daily 90 tablet 1    Multiple Vitamins-Minerals (MULTIVITAMIN ADULT PO) Take 1 capsule by mouth daily      Omega-3 Fatty Acids (EQL OMEGA 3 FISH OIL) 1400 MG CAPS Take 1 capsule by mouth daily      amLODIPine (NORVASC) 5 mg tablet Take 1 tablet (5 mg total) by mouth daily 90 tablet 1    levothyroxine 50 mcg tablet Take 1 tablet (50 mcg total) by mouth daily 90 tablet 1     No current facility-administered medications for this visit.       Objective:    /82 (BP Location: Left arm, Patient Position: Sitting, Cuff Size: Large)   Pulse 82   Temp 98 °F (36.7 °C) (Temporal)   Ht 5' 3\" (1.6 m)   Wt 104 kg (229 lb)   SpO2 99%   BMI 40.57 kg/m²        Physical Exam       Constitutional:  Well developed, well nourished, no acute distress, non-toxic " appearance   Eyes:  PERRL, conjunctiva normal , non icteric sclera  HENT:  Atraumatic, oropharynx moist. Neck-  supple   Respiratory:  CTA b/l, normal breath sounds, no rales, no wheezing   Cardiovascular:  RRR, no murmurs, no LE edema b/l  GI:  Soft, nondistended, normal bowel sounds x 4, nontender, no organomegaly, no mass, no rebound, no guarding   Neurologic:  no focal deficits noted   Psychiatric:  Speech and behavior appropriate , AAO x 3        Joycelyn Shanks DO

## 2023-10-09 NOTE — TELEPHONE ENCOUNTER
Upon review of the In Basket request we were able to locate, review, and update the patient chart as requested for Mammogram.    Any additional questions or concerns should be emailed to the Practice Liaisons via the appropriate education email address, please do not reply via In Basket.     Thank you  Daphney Wade

## 2023-10-09 NOTE — TELEPHONE ENCOUNTER
----- Message from Sinan Madden RN sent at 10/9/2023  8:59 AM EDT -----  10/09/23 8:59 AM    Hello, our patient Maya Vila has had Mammogram completed/performed. Please assist in updating the patient chart by pulling the Care Everywhere (CE) document. The date of service is 8/11/2023.      Thank you,  Nemo Santa, RN  [address] [facility]

## 2024-03-02 DIAGNOSIS — E03.9 HYPOTHYROIDISM, UNSPECIFIED TYPE: ICD-10-CM

## 2024-03-04 RX ORDER — LEVOTHYROXINE SODIUM 0.05 MG/1
50 TABLET ORAL DAILY
Qty: 90 TABLET | Refills: 1 | Status: SHIPPED | OUTPATIENT
Start: 2024-03-04

## 2024-04-01 ENCOUNTER — RA CDI HCC (OUTPATIENT)
Dept: OTHER | Facility: HOSPITAL | Age: 54
End: 2024-04-01

## 2024-04-08 ENCOUNTER — OFFICE VISIT (OUTPATIENT)
Dept: INTERNAL MEDICINE CLINIC | Age: 54
End: 2024-04-08
Payer: COMMERCIAL

## 2024-04-08 VITALS
TEMPERATURE: 98.2 F | HEIGHT: 63 IN | BODY MASS INDEX: 39.51 KG/M2 | DIASTOLIC BLOOD PRESSURE: 72 MMHG | HEART RATE: 88 BPM | WEIGHT: 223 LBS | OXYGEN SATURATION: 99 % | SYSTOLIC BLOOD PRESSURE: 126 MMHG

## 2024-04-08 DIAGNOSIS — E55.9 VITAMIN D DEFICIENCY: ICD-10-CM

## 2024-04-08 DIAGNOSIS — M35.1 MIXED CONNECTIVE TISSUE DISEASE (HCC): ICD-10-CM

## 2024-04-08 DIAGNOSIS — I10 BENIGN ESSENTIAL HYPERTENSION: Primary | ICD-10-CM

## 2024-04-08 DIAGNOSIS — M25.50 ARTHRALGIA OF MULTIPLE JOINTS: ICD-10-CM

## 2024-04-08 DIAGNOSIS — E03.9 ACQUIRED HYPOTHYROIDISM: ICD-10-CM

## 2024-04-08 DIAGNOSIS — E78.1 ESSENTIAL HYPERTRIGLYCERIDEMIA: ICD-10-CM

## 2024-04-08 DIAGNOSIS — I10 HYPERTENSION, UNSPECIFIED TYPE: ICD-10-CM

## 2024-04-08 PROCEDURE — 99214 OFFICE O/P EST MOD 30 MIN: CPT | Performed by: FAMILY MEDICINE

## 2024-04-08 RX ORDER — AMLODIPINE BESYLATE 5 MG/1
5 TABLET ORAL DAILY
Qty: 90 TABLET | Refills: 1 | Status: SHIPPED | OUTPATIENT
Start: 2024-04-08

## 2024-04-08 NOTE — PROGRESS NOTES
Assessment/Plan:    1. Benign essential hypertension  -     CBC and differential; Future  -     CBC and differential    2. Essential hypertriglyceridemia  -     Lipid Panel with Direct LDL reflex; Future  -     Lipid Panel with Direct LDL reflex    3. Acquired hypothyroidism    4. Arthralgia of multiple joints    5. Vitamin D deficiency    6. Mixed connective tissue disease (HCC)    7. Hypertension, unspecified type  -     amLODIPine (NORVASC) 5 mg tablet; Take 1 tablet (5 mg total) by mouth daily    BMI counseling: the BMI is above normal. Nutrition recommendations include reducing portion sizes and increasing intake of lean protein. Exercise recommendations include moderate aerobic physical activity for 150 minutes/week.        There are no Patient Instructions on file for this visit.    Return in about 6 months (around 10/8/2024).    Subjective:      Patient ID: Sheree Cates is a 53 y.o. female.    Chief Complaint   Patient presents with    Follow-up     6 month follow up- labs done 4/3-  Patient refuses all CRC screenings       HPI      Hyperlipidemia : The patient is being seen for a routine clinic follow-up of hyperlipidemia. Recent measurements for this patient were taken changed fish oil brand. Recent measurements: total cholesterol 182 mg/dL and  mg/dL. Her total cholesterol is decreasing and LDL is increasing. Associated symptoms:  no chest pain,-no exertional calf cramps-and-no nausea-  Oct 2019 due for labs now, last labs are controlled.  November 2020, no issues and controlled  The patient presents with complaints of myalgias (attributes to lupus and connective tissue disease).   May 2021:  Elevated on this visit.  Has never had any problems in the past.  Has not been changing her diet or eating poorly and is still very active.  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  She has been doing of studying and  research regarding nutrition because of her mother's cardiac condition they had been eating.  She notices she feels better also  March 2022,  with slight increase in total cholesterol.  Has been eating poorly because her mother has been very sick and requiring near total care and she has been helping her father at home.   Sept 2022: lipids are improved despite weight gain   March 2023 significant improvement in lipid levels despite weight gain.  He has gained 6 pounds since last visit.  April 2024 slightly elevated triglycerides     SLE (Brief): The patient is being seen for a routine clinic follow-up of systemic lupus erythematosus. Symptoms:  joint pain, but-no fever,-no malaise,-no weight loss-and-no chest pain-   The patient presents with complaints of visual problems (follows with eye doctor every 6 months. ). (Follows with rheumatology. Plaquenil daily, Mobic daily, and Flexeril as needed. )  April 2019, now has been diagnosed not with lupus with mixed connective tissue disease feels worse with barometric pressure elevated but is working fine and not missing work oct 2019 symptoms are better since her busy work season is betetr ow November 2020, no changes and is stable  May 2021, relatively stable.  CRP is 8.8 and she has an appointment twice a year with her rheumatologist  September 2021 no changes  March 2022, no changes  Sept 2022: no issues but has a lt of stress and is having arthalgias   March 2023, stable follows with rheumatology  April 2024, stable sees rheumatology yearly     Vitamin D Deficiency: The patient is being seen for follow-up of vitamin D deficiency. Current treatment includes vitamin D3 (cholecalciferol) and 6000 IU per day. Symptoms:  myalgias but-no fatigue,-no brittle nails-and-no depression.  April 2019, taking increase higher doses over-the-counter of 8000 international units.  Does not want weekly dose at this time.  Level is 32. Followed by Rheumatology oct 2019 now sees  them yearly, due for labs November 2020, vitamin-D level in range now   May 2021, vitamin-D levels in range now and patient states she feels better at this level   September 2020, taking 10,000 international daily during the week days and nothing on the weekends.  Her level was 71 and maintained at 67  March 2022, due for labs at next visit  Sept 2022: level is good at 77, 10K m-f  March 2023, no issues, taking over-the-counter vitamin D  April 2024, in range     Hypertension (Follow-Up): The patient presents for follow-up of essential hypertension. The patient states she has been doing well with her blood pressure control since the last visit.   Symptoms: denies impaired vision,-denies dyspnea,-denies chest pain,-denies intermittent leg claudication-and-improved lower extremity edema. Associated symptoms include no headache.   Home monitoring: The patient is not checking blood pressure at home.   Lifestyle: Diet: She consumes a diverse and healthy diet.Exercise: She exercises regularly. 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    March 2022, well controlled.  Sept 2022: well controlled despite weight gain, no issues with meds   March 2023, well controlled  April 2024, usually well-controlled but elevated at the moment for today.  She is not sure why     Caregiver stress, patient's mother has been in the hospital for several months and now is home with her father.  She is helping to take care of her and goes every days been several hours and does the cooking cleaning and running errands and taking her to the doctor's appointment.  She is stressed out is not getting enough sleep and feels that she is ignoring her own house on her own  and is not eating well  Sept 2022: no changes, still taking care of mom and helping with the family business   March 2023, stable, her mother has not been in and out of the hospital and has  been improving.  She feels that she is back up to 85%.  She is still the primary caretaker and 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  April 2024, her mother passed away in March 2023    The following portions of the patient's history were reviewed and updated as appropriate: allergies, current medications, past family history, past medical history, past social history, past surgical history and problem list.    Review of Systems        Constitutional:  Denies fever or chills   Eyes:  Denies double , blurry vision or eye pain  HENT:  Denies nasal congestion, sore throat or new hearing issues  Respiratory:  Denies cough or shortness of breath or wheezing  Cardiovascular:  Denies palpitations or chest pain  GI:  Denies abdominal pain, nausea, or vomiting, no loose stools, no reflux  Integument:  Denies rash , no open areas  Neurologic:  Denies headache or focal weakness, no dizziness  : no dysuria, or hematuria          Current Outpatient Medications   Medication Sig Dispense Refill    amLODIPine (NORVASC) 5 mg tablet Take 1 tablet (5 mg total) by mouth daily 90 tablet 1    Ascorbic Acid (VITAMIN C ER) 1000 MG TBCR Take 1,000 mg by mouth daily      Cholecalciferol 2000 units CAPS Take 10,000 Units by mouth in the morning 10,000 units Monday-Friday      cyclobenzaprine (FLEXERIL) 10 mg tablet Take 1 tablet (10 mg total) by mouth daily as needed for muscle spasms 90 tablet 1    hydroxychloroquine (PLAQUENIL) 200 mg tablet Take 2 tablets (400 mg total) by mouth daily 180 tablet 1    levothyroxine 50 mcg tablet Take 1 tablet (50 mcg total) by mouth daily 90 tablet 1    meloxicam (Mobic) 15 mg tablet Take 1 tablet (15 mg total) by mouth daily 90 tablet 1    Multiple Vitamins-Minerals (MULTIVITAMIN ADULT PO) Take 1 capsule by mouth daily      Omega-3 Fatty Acids (EQL OMEGA 3 FISH OIL) 1400 MG CAPS Take 1 capsule by mouth daily       No current facility-administered medications for this visit.       Objective:    /72  "(BP Location: Left arm, Patient Position: Sitting, Cuff Size: Large)   Pulse 88   Temp 98.2 °F (36.8 °C) (Temporal)   Ht 5' 3\" (1.6 m)   Wt 101 kg (223 lb)   SpO2 99% Comment: room air  BMI 39.50 kg/m²        Physical Exam      Constitutional:  Well developed, well nourished, no acute distress, non-toxic appearance   Eyes:  PERRL, conjunctiva normal , non icteric sclera  HENT:  Atraumatic, oropharynx moist. Neck-  supple   Respiratory:  CTA b/l, normal breath sounds, no rales, no wheezing   Cardiovascular:  RRR, no murmurs, no LE edema b/l  GI:  Soft, nondistended, normal bowel sounds x 4, nontender, no organomegaly, no mass, no rebound, no guarding   Neurologic:  no focal deficits noted   Psychiatric:  Speech and behavior appropriate , AAO x 3      Joycelyn Shanks,   "

## 2024-06-10 ENCOUNTER — VBI (OUTPATIENT)
Dept: ADMINISTRATIVE | Facility: OTHER | Age: 54
End: 2024-06-10

## 2024-06-10 NOTE — TELEPHONE ENCOUNTER
06/10/24 9:12 AM     Chart reviewed for CRC: Colonoscopy was/were not submitted to the patient's insurance.     AILEEN MUNIZ   [facility]

## 2024-09-08 DIAGNOSIS — E03.9 HYPOTHYROIDISM, UNSPECIFIED TYPE: ICD-10-CM

## 2024-09-09 RX ORDER — LEVOTHYROXINE SODIUM 50 UG/1
50 TABLET ORAL DAILY
Qty: 90 TABLET | Refills: 1 | Status: SHIPPED | OUTPATIENT
Start: 2024-09-09

## 2024-10-05 LAB
BASOPHILS # BLD AUTO: 0 THOU/CMM (ref 0–0.1)
BASOPHILS NFR BLD AUTO: 1 %
CHOLEST SERPL-MCNC: 190 MG/DL
CHOLEST/HDLC SERPL: 3.2 {RATIO}
DIFFERENTIAL METHOD BLD: NORMAL
EOSINOPHIL # BLD AUTO: 0.1 THOU/CMM (ref 0–0.5)
EOSINOPHIL NFR BLD AUTO: 1 %
ERYTHROCYTE [DISTWIDTH] IN BLOOD BY AUTOMATED COUNT: 12.9 % (ref 12–16)
HCT VFR BLD AUTO: 39.4 % (ref 35–43)
HDLC SERPL-MCNC: 59 MG/DL (ref 23–92)
HGB BLD-MCNC: 13.2 G/DL (ref 11.5–14.5)
LDLC SERPL CALC-MCNC: 109 MG/DL
LYMPHOCYTES # BLD AUTO: 1.6 THOU/CMM (ref 1–3)
LYMPHOCYTES NFR BLD AUTO: 31 %
MCH RBC QN AUTO: 30 PG (ref 26–34)
MCHC RBC AUTO-ENTMCNC: 33.4 G/DL (ref 32–37)
MCV RBC AUTO: 90 FL (ref 80–100)
MONOCYTES # BLD AUTO: 0.5 THOU/CMM (ref 0.3–1)
MONOCYTES NFR BLD AUTO: 10 %
NEUTROPHILS # BLD AUTO: 3 THOU/CMM (ref 1.8–7.8)
NEUTROPHILS NFR BLD AUTO: 57 %
NONHDLC SERPL-MCNC: 131 MG/DL
PLATELET # BLD AUTO: 224 THOU/CMM (ref 140–350)
PMV BLD REES-ECKER: 9.7 FL (ref 7.5–11.3)
RBC # BLD AUTO: 4.4 MILL/CMM (ref 3.7–4.7)
TRIGL SERPL-MCNC: 111 MG/DL
WBC # BLD AUTO: 5.2 THOU/CMM (ref 4–10)

## 2024-10-10 ENCOUNTER — OFFICE VISIT (OUTPATIENT)
Age: 54
End: 2024-10-10
Payer: COMMERCIAL

## 2024-10-10 VITALS
BODY MASS INDEX: 37.92 KG/M2 | HEIGHT: 63 IN | SYSTOLIC BLOOD PRESSURE: 136 MMHG | HEART RATE: 83 BPM | TEMPERATURE: 99.4 F | WEIGHT: 214 LBS | OXYGEN SATURATION: 99 % | DIASTOLIC BLOOD PRESSURE: 80 MMHG

## 2024-10-10 DIAGNOSIS — Z12.11 ENCOUNTER FOR SCREENING FOR MALIGNANT NEOPLASM OF COLON: ICD-10-CM

## 2024-10-10 DIAGNOSIS — Z23 NEED FOR INFLUENZA VACCINATION: ICD-10-CM

## 2024-10-10 DIAGNOSIS — E03.9 ACQUIRED HYPOTHYROIDISM: ICD-10-CM

## 2024-10-10 DIAGNOSIS — E03.9 HYPOTHYROIDISM, UNSPECIFIED TYPE: ICD-10-CM

## 2024-10-10 DIAGNOSIS — E78.1 ESSENTIAL HYPERTRIGLYCERIDEMIA: ICD-10-CM

## 2024-10-10 DIAGNOSIS — I10 HYPERTENSION, UNSPECIFIED TYPE: ICD-10-CM

## 2024-10-10 DIAGNOSIS — I10 BENIGN ESSENTIAL HYPERTENSION: Primary | ICD-10-CM

## 2024-10-10 PROCEDURE — 90673 RIV3 VACCINE NO PRESERV IM: CPT

## 2024-10-10 PROCEDURE — 99214 OFFICE O/P EST MOD 30 MIN: CPT | Performed by: FAMILY MEDICINE

## 2024-10-10 PROCEDURE — 90471 IMMUNIZATION ADMIN: CPT

## 2024-10-10 RX ORDER — LEVOTHYROXINE SODIUM 50 UG/1
50 TABLET ORAL DAILY
Qty: 90 TABLET | Refills: 3 | Status: SHIPPED | OUTPATIENT
Start: 2024-10-10

## 2024-10-10 RX ORDER — AMLODIPINE BESYLATE 5 MG/1
5 TABLET ORAL DAILY
Qty: 90 TABLET | Refills: 1 | Status: SHIPPED | OUTPATIENT
Start: 2024-10-10

## 2024-10-10 NOTE — PROGRESS NOTES
Assessment/Plan:    1. Benign essential hypertension  2. Encounter for screening for malignant neoplasm of colon  -     Ambulatory Referral to Gastroenterology; Future  3. Need for influenza vaccination  -     influenza vaccine, recombinant, PF, 0.5 mL IM (Flublok)  4. Acquired hypothyroidism  -     TSH, 3rd generation with Free T4 reflex; Future  -     TSH, 3rd generation with Free T4 reflex  5. Essential hypertriglyceridemia  -     Lipid Panel with Direct LDL reflex; Future  -     Comprehensive metabolic panel; Future  -     CBC and differential; Future  -     Lipid Panel with Direct LDL reflex  -     Comprehensive metabolic panel  -     CBC and differential  6. BMI 38.0-38.9,adult  -     Vitamin D 25 hydroxy; Future  -     Vitamin D 25 hydroxy  7. Hypertension, unspecified type  -     amLODIPine (NORVASC) 5 mg tablet; Take 1 tablet (5 mg total) by mouth daily  8. Hypothyroidism, unspecified type  -     levothyroxine 50 mcg tablet; Take 1 tablet (50 mcg total) by mouth daily          There are no Patient Instructions on file for this visit.    Return in about 6 months (around 4/10/2025).    Subjective:      Patient ID: Sheree Cates is a 54 y.o. female.    Chief Complaint   Patient presents with    Follow-up     6 m f/u visit for hypertension, review labs done 10/1/24.  CRC testing declined.  Received a flu vaccine.       HPI    Hyperlipidemia : The patient is being seen for a routine clinic follow-up of hyperlipidemia. Recent measurements for this patient were taken changed fish oil brand. Recent measurements: total cholesterol 182 mg/dL and  mg/dL. Her total cholesterol is decreasing and LDL is increasing. Associated symptoms:  no chest pain,-no exertional calf cramps-and-no nausea-  Oct 2019 due for labs now, last labs are controlled.  November 2020, no issues and controlled  The patient presents with complaints of myalgias (attributes to lupus and connective tissue disease).   May 2021:  Elevated on  this visit.  Has never had any problems in the past.  Has not been changing her diet or eating poorly and is still very active.  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  She has been doing of studying and research regarding nutrition because of her mother's cardiac condition they had been eating.  She notices she feels better also  March 2022,  with slight increase in total cholesterol.  Has been eating poorly because her mother has been very sick and requiring near total care and she has been helping her father at home.   Sept 2022: lipids are improved despite weight gain   March 2023 significant improvement in lipid levels despite weight gain.  He has gained 6 pounds since last visit.  April 2024 slightly elevated triglycerides  October 2024, improvement in triglycerides, level is 111,  and patient is feeling well     SLE (Brief): The patient is being seen for a routine clinic follow-up of systemic lupus erythematosus. Symptoms:  joint pain, but-no fever,-no malaise,-no weight loss-and-no chest pain-   The patient presents with complaints of visual problems (follows with eye doctor every 6 months. ). (Follows with rheumatology. Plaquenil daily, Mobic daily, and Flexeril as needed. )  April 2019, now has been diagnosed not with lupus with mixed connective tissue disease feels worse with barometric pressure elevated but is working fine and not missing work oct 2019 symptoms are better since her busy work season is betetr ow November 2020, no changes and is stable  May 2021, relatively stable.  CRP is 8.8 and she has an appointment twice a year with her rheumatologist  September 2021 no changes  March 2022, no changes  Sept 2022: no issues but has a lt of stress and is having arthalgias   March 2023, stable follows with rheumatology  April 2024, stable sees rheumatology yearly  October 2024, stable symptoms, follows with  rheumatology has an appointment upcoming soon.  No new changes     Vitamin D Deficiency: The patient is being seen for follow-up of vitamin D deficiency. Current treatment includes vitamin D3 (cholecalciferol) and 6000 IU per day. Symptoms:  myalgias but-no fatigue,-no brittle nails-and-no depression.  April 2019, taking increase higher doses over-the-counter of 8000 international units.  Does not want weekly dose at this time.  Level is 32. Followed by Rheumatology oct 2019 now sees them yearly, due for labs November 2020, vitamin-D level in range now   May 2021, vitamin-D levels in range now and patient states she feels better at this level   September 2020, taking 10,000 international daily during the week days and nothing on the weekends.  Her level was 71 and maintained at 67  March 2022, due for labs at next visit  Sept 2022: level is good at 77, 10K m-f  March 2023, no issues, taking over-the-counter vitamin D  April 2024, in range  October 2024, on supplementation and doing well, remains very active       Hypertension (Follow-Up): The patient presents for follow-up of essential hypertension. The patient states she has been doing well with her blood pressure control since the last visit.   Symptoms: denies impaired vision,-denies dyspnea,-denies chest pain,-denies intermittent leg claudication-and-improved lower extremity edema. Associated symptoms include no headache.   Home monitoring: The patient is not checking blood pressure at home.   Lifestyle: Diet: She consumes a diverse and healthy diet.Exercise: She exercises regularly. 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    March 2022, well controlled.  Sept 2022: well controlled despite weight gain, no issues with meds   March 2023, well controlled  April 2024, usually well-controlled but elevated at the moment for today.  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  She is helping to take care of her and goes every days been several hours and does the cooking cleaning and running errands and taking her to the doctor's appointment.  She is stressed out is not getting enough sleep and feels that she is ignoring her own house on her own  and is not eating well  Sept 2022: no changes, still taking care of mom and helping with the family business   March 2023, stable, her mother has not been in and out of the hospital and has been improving.  She feels that she is back up to 85%.  She is still the primary caretaker and 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  April 2024, her mother passed away in March 2023      The following portions of the patient's history were reviewed and updated as appropriate: allergies, current medications, past family history, past medical history, past social history, past surgical history and problem list.    Review of Systems      Constitutional:  Denies fever or chills , intentional weight loss   Eyes:  Denies double , blurry vision or eye pain  HENT:  Denies nasal congestion, sore throat or new hearing issues  Respiratory:  Denies cough or shortness of breath or wheezing  Cardiovascular:  Denies palpitations or chest pain  GI:  Denies abdominal pain, nausea, or vomiting, no loose stools, no reflux  Integument:  Denies rash , no open areas  Neurologic:  Denies headache or focal weakness, no dizziness  : no dysuria, or hematuria      Current Outpatient Medications   Medication Sig Dispense Refill    amLODIPine (NORVASC) 5 mg tablet Take 1 tablet (5 mg total) by mouth daily 90 tablet 1    Ascorbic Acid (VITAMIN C ER) 1000 MG TBCR Take 1,000 mg by mouth daily      Cholecalciferol 2000 units CAPS Take 10,000 Units by mouth in the morning 10,000 units  "Monday-Friday      cyclobenzaprine (FLEXERIL) 10 mg tablet Take 1 tablet (10 mg total) by mouth daily as needed for muscle spasms 90 tablet 1    hydroxychloroquine (PLAQUENIL) 200 mg tablet Take 2 tablets (400 mg total) by mouth daily 180 tablet 1    levothyroxine 50 mcg tablet Take 1 tablet (50 mcg total) by mouth daily 90 tablet 3    meloxicam (Mobic) 15 mg tablet Take 1 tablet (15 mg total) by mouth daily 90 tablet 1    Multiple Vitamins-Minerals (MULTIVITAMIN ADULT PO) Take 1 capsule by mouth daily      Omega-3 Fatty Acids (EQL OMEGA 3 FISH OIL) 1400 MG CAPS Take 1 capsule by mouth daily       No current facility-administered medications for this visit.       Objective:    /80 (BP Location: Left arm, Patient Position: Sitting, Cuff Size: Large)   Pulse 83   Temp 99.4 °F (37.4 °C) (Temporal)   Ht 5' 2.6\" (1.59 m)   Wt 97.1 kg (214 lb)   SpO2 99%   BMI 38.40 kg/m²        Physical Exam    Constitutional:  Well developed, well nourished, no acute distress, non-toxic appearance   Eyes:  PERRL, conjunctiva normal , non icteric sclera  HENT:  Atraumatic, oropharynx moist. Neck-  supple   Respiratory:  CTA b/l, normal breath sounds, no rales, no wheezing   Cardiovascular:  RRR, no murmurs, no LE edema b/l  GI:  Soft, nondistended, normal bowel sounds x 4, nontender, no organomegaly, no mass, no rebound, no guarding   Neurologic:  no focal deficits noted   Psychiatric:  Speech and behavior appropriate , AAO x 3      Joycelyn Shanks DO  "

## 2025-04-11 LAB
25(OH)D3+25(OH)D2 SERPL-MCNC: 72 NG/ML (ref 30–100)
ALBUMIN SERPL-MCNC: 4.4 G/DL (ref 3.5–5.7)
ALP SERPL-CCNC: 60 U/L (ref 35–120)
ALT SERPL-CCNC: 23 U/L
ANION GAP SERPL CALCULATED.3IONS-SCNC: 8 MMOL/L (ref 3–11)
AST SERPL-CCNC: 23 U/L
BASOPHILS # BLD AUTO: 0 THOU/CMM (ref 0–0.1)
BASOPHILS NFR BLD AUTO: 1 %
BILIRUB SERPL-MCNC: 0.4 MG/DL (ref 0.2–1)
BUN SERPL-MCNC: 13 MG/DL (ref 7–25)
CALCIUM SERPL-MCNC: 9.3 MG/DL (ref 8.5–10.5)
CHLORIDE SERPL-SCNC: 101 MMOL/L (ref 100–109)
CHOLEST SERPL-MCNC: 210 MG/DL
CHOLEST/HDLC SERPL: 2.9 {RATIO}
CO2 SERPL-SCNC: 31 MMOL/L (ref 21–31)
CREAT SERPL-MCNC: 0.69 MG/DL (ref 0.4–1.1)
CYTOLOGY CMNT CVX/VAG CYTO-IMP: NORMAL
DIFFERENTIAL METHOD BLD: NORMAL
EOSINOPHIL # BLD AUTO: 0.1 THOU/CMM (ref 0–0.5)
EOSINOPHIL NFR BLD AUTO: 1 %
ERYTHROCYTE [DISTWIDTH] IN BLOOD BY AUTOMATED COUNT: 13.3 % (ref 12–16)
GFR/BSA.PRED SERPLBLD CYS-BASED-ARV: 104 ML/MIN/{1.73_M2}
GLUCOSE SERPL-MCNC: 90 MG/DL (ref 65–99)
HCT VFR BLD AUTO: 39.5 % (ref 35–43)
HDLC SERPL-MCNC: 72 MG/DL (ref 23–92)
HGB BLD-MCNC: 13.2 G/DL (ref 11.5–14.5)
LDLC SERPL CALC-MCNC: 115 MG/DL
LYMPHOCYTES # BLD AUTO: 1.5 THOU/CMM (ref 1–3)
LYMPHOCYTES NFR BLD AUTO: 31 %
MCH RBC QN AUTO: 29.3 PG (ref 26–34)
MCHC RBC AUTO-ENTMCNC: 33.5 G/DL (ref 32–37)
MCV RBC AUTO: 87 FL (ref 80–100)
MONOCYTES # BLD AUTO: 0.5 THOU/CMM (ref 0.3–1)
MONOCYTES NFR BLD AUTO: 10 %
NEUTROPHILS # BLD AUTO: 2.9 THOU/CMM (ref 1.8–7.8)
NEUTROPHILS NFR BLD AUTO: 57 %
NONHDLC SERPL-MCNC: 138 MG/DL
PLATELET # BLD AUTO: 230 THOU/CMM (ref 140–350)
PMV BLD REES-ECKER: 9 FL (ref 7.5–11.3)
POTASSIUM SERPL-SCNC: 4.2 MMOL/L (ref 3.5–5.2)
PROT SERPL-MCNC: 7.2 G/DL (ref 6.3–8.3)
RBC # BLD AUTO: 4.52 MILL/CMM (ref 3.7–4.7)
SODIUM SERPL-SCNC: 140 MMOL/L (ref 135–145)
TRIGL SERPL-MCNC: 115 MG/DL
TSH SERPL-ACNC: 2.42 UIU/ML (ref 0.45–5.33)
WBC # BLD AUTO: 5 THOU/CMM (ref 4–10)

## 2025-04-15 ENCOUNTER — RA CDI HCC (OUTPATIENT)
Dept: OTHER | Facility: HOSPITAL | Age: 55
End: 2025-04-15

## 2025-04-17 ENCOUNTER — OFFICE VISIT (OUTPATIENT)
Dept: INTERNAL MEDICINE CLINIC | Facility: OTHER | Age: 55
End: 2025-04-17
Payer: COMMERCIAL

## 2025-04-17 VITALS
WEIGHT: 227 LBS | SYSTOLIC BLOOD PRESSURE: 124 MMHG | BODY MASS INDEX: 40.22 KG/M2 | TEMPERATURE: 98.5 F | DIASTOLIC BLOOD PRESSURE: 80 MMHG | HEART RATE: 87 BPM | HEIGHT: 63 IN | OXYGEN SATURATION: 99 %

## 2025-04-17 DIAGNOSIS — I10 BENIGN ESSENTIAL HYPERTENSION: ICD-10-CM

## 2025-04-17 DIAGNOSIS — E78.1 ESSENTIAL HYPERTRIGLYCERIDEMIA: ICD-10-CM

## 2025-04-17 DIAGNOSIS — I10 HYPERTENSION, UNSPECIFIED TYPE: ICD-10-CM

## 2025-04-17 DIAGNOSIS — E03.9 ACQUIRED HYPOTHYROIDISM: Primary | ICD-10-CM

## 2025-04-17 DIAGNOSIS — Z53.20 COLON CANCER SCREENING DECLINED: ICD-10-CM

## 2025-04-17 DIAGNOSIS — E55.9 VITAMIN D DEFICIENCY: ICD-10-CM

## 2025-04-17 DIAGNOSIS — M25.50 ARTHRALGIA OF MULTIPLE JOINTS: ICD-10-CM

## 2025-04-17 DIAGNOSIS — M35.1 MIXED CONNECTIVE TISSUE DISEASE (HCC): ICD-10-CM

## 2025-04-17 DIAGNOSIS — E03.9 HYPOTHYROIDISM, UNSPECIFIED TYPE: ICD-10-CM

## 2025-04-17 PROCEDURE — 99214 OFFICE O/P EST MOD 30 MIN: CPT | Performed by: FAMILY MEDICINE

## 2025-04-17 RX ORDER — LEVOTHYROXINE SODIUM 50 UG/1
50 TABLET ORAL DAILY
Qty: 90 TABLET | Refills: 3 | Status: SHIPPED | OUTPATIENT
Start: 2025-04-17

## 2025-04-17 RX ORDER — CYCLOBENZAPRINE HCL 10 MG
10 TABLET ORAL DAILY PRN
Qty: 75 TABLET | Refills: 1 | Status: SHIPPED | OUTPATIENT
Start: 2025-04-17

## 2025-04-17 RX ORDER — AMLODIPINE BESYLATE 5 MG/1
5 TABLET ORAL DAILY
Qty: 90 TABLET | Refills: 1 | Status: SHIPPED | OUTPATIENT
Start: 2025-04-17

## 2025-04-17 NOTE — PROGRESS NOTES
Assessment/Plan:    1. Acquired hypothyroidism  -     TSH, 3rd generation with Free T4 reflex; Future; Expected date: 10/17/2025  -     TSH, 3rd generation with Free T4 reflex  2. Arthralgia of multiple joints  3. Benign essential hypertension  4. Essential hypertriglyceridemia  -     Lipid Panel with Direct LDL reflex; Future; Expected date: 10/17/2025  -     Comprehensive metabolic panel; Future; Expected date: 10/17/2025  -     Lipid Panel with Direct LDL reflex  -     Comprehensive metabolic panel  5. Vitamin D deficiency  6. Mixed connective tissue disease (HCC)  -     cyclobenzaprine (FLEXERIL) 10 mg tablet; Take 1 tablet (10 mg total) by mouth daily as needed for muscle spasms  7. Colon cancer screening declined  8. Hypertension, unspecified type  -     amLODIPine (NORVASC) 5 mg tablet; Take 1 tablet (5 mg total) by mouth daily  9. Hypothyroidism, unspecified type  -     levothyroxine 50 mcg tablet; Take 1 tablet (50 mcg total) by mouth daily          There are no Patient Instructions on file for this visit.    Return in about 6 months (around 10/17/2025) for at bath office.    Subjective:      Patient ID: Sheree Cates is a 54 y.o. female.    Chief Complaint   Patient presents with    Follow-up     Patient here for 6 month follow up   Labs done- 4/11/2025      Hypertension    Hypothyroidism    HM     Mammogram due - 8/20/2025 scheduled with ABDIRIZAK Gómez- due  Depression Screening completed  Cervical cancer screen due- last done 2020 will call and make appointment    Colorectal Cancer Screening due - declined        HPI      Hyperlipidemia : The patient is being seen for a routine clinic follow-up of hyperlipidemia. Recent measurements for this patient were taken changed fish oil brand. Recent measurements: total cholesterol 182 mg/dL and  mg/dL. Her total cholesterol is decreasing and LDL is increasing. Associated symptoms:  no chest pain,-no exertional calf cramps-and-no nausea-  Oct 2019 due for  labs now, last labs are controlled.  November 2020, no issues and controlled  The patient presents with complaints of myalgias (attributes to lupus and connective tissue disease).   May 2021:  Elevated on this visit.  Has never had any problems in the past.  Has not been changing her diet or eating poorly and is still very active.  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  She has been doing of studying and research regarding nutrition because of her mother's cardiac condition they had been eating.  She notices she feels better also  March 2022,  with slight increase in total cholesterol.  Has been eating poorly because her mother has been very sick and requiring near total care and she has been helping her father at home.   Sept 2022: lipids are improved despite weight gain   March 2023 significant improvement in lipid levels despite weight gain.  He has gained 6 pounds since last visit.  April 2024 slightly elevated triglycerides  October 2024, improvement in triglycerides, level is 111,  and patient is feeling well  April 2025: well controlled, no issues, has gained a few pounds     SLE (Brief): The patient is being seen for a routine clinic follow-up of systemic lupus erythematosus. Symptoms:  joint pain, but-no fever,-no malaise,-no weight loss-and-no chest pain-   The patient presents with complaints of visual problems (follows with eye doctor every 6 months. ). (Follows with rheumatology. Plaquenil daily, Mobic daily, and Flexeril as needed. )  April 2019, now has been diagnosed not with lupus with mixed connective tissue disease feels worse with barometric pressure elevated but is working fine and not missing work oct 2019 symptoms are better since her busy work season is betetr ow November 2020, no changes and is stable  May 2021, relatively stable.  CRP is 8.8 and she has an appointment twice a year with her  rheumatologist  September 2021 no changes  March 2022, no changes  Sept 2022: no issues but has a lt of stress and is having arthalgias   March 2023, stable follows with rheumatology  April 2024, stable sees rheumatology yearly  October 2024, stable symptoms, follows with rheumatology has an appointment upcoming soon.  No new changes  April2025: feels well, less aches and pains, labs are all wnl, follows [facility] rheum at [facility]     Vitamin D Deficiency: The patient is being seen for follow-up of vitamin D deficiency. Current treatment includes vitamin D3 (cholecalciferol) and 6000 IU per day. Symptoms:  myalgias but-no fatigue,-no brittle nails-and-no depression.  April 2019, taking increase higher doses over-the-counter of 8000 international units.  Does not want weekly dose at this time.  Level is 32. Followed by Rheumatology oct 2019 now sees them yearly, due for labs November 2020, vitamin-D level in range now   May 2021, vitamin-D levels in range now and patient states she feels better at this level   September 2020, taking 10,000 international daily during the week days and nothing on the weekends.  Her level was 71 and maintained at 67  March 2022, due for labs at next visit  Sept 2022: level is good at 77, 10K m-f  March 2023, no issues, taking over-the-counter vitamin D  April 2024, in range  October 2024, on supplementation and doing well, remains very active   April 2025: on supplement, doing well, level is 72     Hypertension (Follow-Up): The patient presents for follow-up of essential hypertension. The patient states she has been doing well with her blood pressure control since the last visit.   Symptoms: denies impaired vision,-denies dyspnea,-denies chest pain,-denies intermittent leg claudication-and-improved lower extremity edema. Associated symptoms include no headache.   Home monitoring: The patient is not checking blood pressure at home.   Lifestyle: Diet: She consumes a diverse and healthy  diet.Exercise: She exercises regularly. 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    March 2022, well controlled.  Sept 2022: well controlled despite weight gain, no issues with meds   March 2023, well controlled  April 2024, usually well-controlled but elevated at the moment for today.  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    Review of Systems      Constitutional:  Denies fever or chills , + weight gain  Eyes:  Denies double , blurry vision or eye pain  HENT:  Denies nasal congestion, sore throat or new hearing issues  Respiratory:  Denies cough or shortness of breath or wheezing  Cardiovascular:  Denies palpitations or chest pain  GI:  Denies abdominal pain, nausea, or vomiting, no loose stools, no reflux  Integument:  Denies rash , no open areas  Neurologic:  Denies headache or focal weakness, no dizziness  : no dysuria, or hematuria      Current Outpatient Medications   Medication Sig Dispense Refill    amLODIPine (NORVASC) 5 mg tablet Take 1 tablet (5 mg total) by mouth daily 90 tablet 1    Ascorbic Acid (VITAMIN C ER) 1000 MG TBCR Take 1,000 mg by mouth daily      Cholecalciferol 2000 units CAPS Take 10,000 Units by mouth in the morning 10,000 units Monday-Friday      cyclobenzaprine (FLEXERIL) 10 mg tablet Take 1 tablet (10 mg total) by mouth daily as needed for muscle spasms 75 tablet 1    hydroxychloroquine (PLAQUENIL) 200 mg tablet Take 2 tablets (400 mg total) by mouth daily 180 tablet 1    levothyroxine 50 mcg tablet Take 1  "tablet (50 mcg total) by mouth daily 90 tablet 3    meloxicam (Mobic) 15 mg tablet Take 1 tablet (15 mg total) by mouth daily 90 tablet 1    Multiple Vitamins-Minerals (MULTIVITAMIN ADULT PO) Take 1 capsule by mouth daily      Omega-3 Fatty Acids (EQL OMEGA 3 FISH OIL) 1400 MG CAPS Take 1 capsule by mouth daily       No current facility-administered medications for this visit.       Objective:    /80 (BP Location: Left arm, Patient Position: Sitting, Cuff Size: Adult)   Pulse 87   Temp 98.5 °F (36.9 °C)   Ht 5' 2.5\" (1.588 m)   Wt 103 kg (227 lb)   SpO2 99%   BMI 40.86 kg/m²        Physical Exam      Constitutional:  Well developed, well nourished, no acute distress, non-toxic appearance   Eyes:  PERRL, conjunctiva normal , non icteric sclera  HENT:  Atraumatic, oropharynx moist. Neck-  supple   Respiratory:  CTA b/l, normal breath sounds, no rales, no wheezing   Cardiovascular:  RRR, no murmurs, no LE edema b/l  GI:  Soft, nondistended, normal bowel sounds x 4, nontender, no organomegaly, no mass, no rebound, no guarding   Neurologic:  no focal deficits noted   Psychiatric:  Speech and behavior appropriate , AAO x 3      Joycelyn Shanks DO"